# Patient Record
Sex: MALE | Race: WHITE | Employment: OTHER | URBAN - NONMETROPOLITAN AREA
[De-identification: names, ages, dates, MRNs, and addresses within clinical notes are randomized per-mention and may not be internally consistent; named-entity substitution may affect disease eponyms.]

---

## 2017-06-01 DIAGNOSIS — I10 ESSENTIAL HYPERTENSION: Primary | ICD-10-CM

## 2017-06-02 RX ORDER — LISINOPRIL 20 MG/1
TABLET ORAL
Qty: 90 TABLET | Refills: 3 | Status: SHIPPED | OUTPATIENT
Start: 2017-06-02 | End: 2017-09-20 | Stop reason: ALTCHOICE

## 2017-06-02 RX ORDER — METOPROLOL TARTRATE 50 MG/1
TABLET, FILM COATED ORAL
Qty: 90 TABLET | Refills: 3 | Status: SHIPPED | OUTPATIENT
Start: 2017-06-02 | End: 2017-09-20 | Stop reason: DRUGHIGH

## 2017-07-06 ENCOUNTER — TELEPHONE (OUTPATIENT)
Dept: CARDIOLOGY | Age: 72
End: 2017-07-06

## 2017-09-20 ENCOUNTER — OFFICE VISIT (OUTPATIENT)
Dept: CARDIOLOGY | Age: 72
End: 2017-09-20
Payer: MEDICARE

## 2017-09-20 VITALS
SYSTOLIC BLOOD PRESSURE: 122 MMHG | WEIGHT: 160 LBS | BODY MASS INDEX: 25.11 KG/M2 | HEART RATE: 88 BPM | HEIGHT: 67 IN | DIASTOLIC BLOOD PRESSURE: 70 MMHG

## 2017-09-20 DIAGNOSIS — Z95.5 HISTORY OF CORONARY ARTERY STENT PLACEMENT: ICD-10-CM

## 2017-09-20 DIAGNOSIS — I10 ESSENTIAL HYPERTENSION: ICD-10-CM

## 2017-09-20 DIAGNOSIS — E78.2 MIXED HYPERLIPIDEMIA: ICD-10-CM

## 2017-09-20 DIAGNOSIS — I25.10 CORONARY ARTERY DISEASE INVOLVING NATIVE CORONARY ARTERY OF NATIVE HEART WITHOUT ANGINA PECTORIS: Primary | ICD-10-CM

## 2017-09-20 PROCEDURE — 99213 OFFICE O/P EST LOW 20 MIN: CPT | Performed by: NURSE PRACTITIONER

## 2017-09-20 PROCEDURE — 93000 ELECTROCARDIOGRAM COMPLETE: CPT | Performed by: NURSE PRACTITIONER

## 2017-09-20 RX ORDER — PANTOPRAZOLE SODIUM 40 MG/1
40 TABLET, DELAYED RELEASE ORAL DAILY
COMMUNITY

## 2017-09-20 RX ORDER — VISMODEGIB 150 MG/1
150 CAPSULE ORAL DAILY
COMMUNITY
Start: 2017-06-21

## 2017-09-20 RX ORDER — MAGNESIUM OXIDE 400 MG/1
400 TABLET ORAL 2 TIMES DAILY
COMMUNITY

## 2017-09-20 RX ORDER — GUAIFENESIN 600 MG/1
1200 TABLET, EXTENDED RELEASE ORAL DAILY
COMMUNITY

## 2017-09-20 RX ORDER — HYDROCODONE BITARTRATE AND ACETAMINOPHEN 7.5; 325 MG/1; MG/1
1 TABLET ORAL PRN
COMMUNITY
Start: 2017-08-08

## 2017-09-20 RX ORDER — PROCHLORPERAZINE MALEATE 10 MG
10 TABLET ORAL EVERY 6 HOURS PRN
COMMUNITY

## 2017-09-20 RX ORDER — ALBUTEROL SULFATE 2.5 MG/3ML
2.5 SOLUTION RESPIRATORY (INHALATION) NIGHTLY
COMMUNITY
Start: 2017-06-26

## 2017-09-21 PROBLEM — E78.2 MIXED HYPERLIPIDEMIA: Status: ACTIVE | Noted: 2017-09-21

## 2017-09-21 PROBLEM — Z95.5 HISTORY OF CORONARY ARTERY STENT PLACEMENT: Status: ACTIVE | Noted: 2017-09-21

## 2017-11-28 ENCOUNTER — TELEPHONE (OUTPATIENT)
Dept: NEUROSURGERY | Facility: CLINIC | Age: 72
End: 2017-11-28

## 2017-11-28 ENCOUNTER — OFFICE VISIT (OUTPATIENT)
Dept: NEUROSURGERY | Facility: CLINIC | Age: 72
End: 2017-11-28

## 2017-11-28 ENCOUNTER — PREP FOR SURGERY (OUTPATIENT)
Dept: OTHER | Facility: HOSPITAL | Age: 72
End: 2017-11-28

## 2017-11-28 VITALS
DIASTOLIC BLOOD PRESSURE: 80 MMHG | HEIGHT: 68 IN | BODY MASS INDEX: 23.49 KG/M2 | SYSTOLIC BLOOD PRESSURE: 120 MMHG | WEIGHT: 155 LBS

## 2017-11-28 DIAGNOSIS — IMO0001 NORMAL BODY MASS INDEX (BMI): ICD-10-CM

## 2017-11-28 DIAGNOSIS — J98.8 RESPIRATORY DECOMPENSATION: ICD-10-CM

## 2017-11-28 DIAGNOSIS — IMO0001 COMPRESSION FRACTURE OF VERTEBRA, INITIAL ENCOUNTER: Primary | ICD-10-CM

## 2017-11-28 DIAGNOSIS — S22.080A T12 COMPRESSION FRACTURE (HCC): ICD-10-CM

## 2017-11-28 DIAGNOSIS — S32.030A CLOSED COMPRESSION FRACTURE OF THIRD LUMBAR VERTEBRA, INITIAL ENCOUNTER: ICD-10-CM

## 2017-11-28 DIAGNOSIS — S32.010A CLOSED COMPRESSION FRACTURE OF FIRST LUMBAR VERTEBRA, INITIAL ENCOUNTER: Primary | ICD-10-CM

## 2017-11-28 DIAGNOSIS — Z87.891 FORMER SMOKER: ICD-10-CM

## 2017-11-28 PROBLEM — M48.50XA VERTEBRAL COMPRESSION FRACTURE (HCC): Status: ACTIVE | Noted: 2017-11-28

## 2017-11-28 PROCEDURE — 99204 OFFICE O/P NEW MOD 45 MIN: CPT | Performed by: NURSE PRACTITIONER

## 2017-11-28 RX ORDER — MEGESTROL ACETATE 40 MG/1
40 TABLET ORAL
COMMUNITY
Start: 2017-11-02

## 2017-11-28 RX ORDER — HYDROCODONE BITARTRATE AND ACETAMINOPHEN 7.5; 325 MG/1; MG/1
1 TABLET ORAL EVERY 6 HOURS PRN
COMMUNITY
Start: 2017-08-08 | End: 2017-12-12 | Stop reason: HOSPADM

## 2017-11-28 RX ORDER — CYANOCOBALAMIN/FOLIC AC/VIT B6 1-2.5-25MG
1 TABLET ORAL DAILY
COMMUNITY
Start: 2017-09-21

## 2017-11-28 RX ORDER — MAGNESIUM OXIDE 400 MG/1
400 TABLET ORAL 2 TIMES DAILY
COMMUNITY

## 2017-11-28 RX ORDER — NITROGLYCERIN 0.4 MG/1
TABLET SUBLINGUAL
COMMUNITY
Start: 2016-10-14

## 2017-11-28 RX ORDER — CARBAMAZEPINE 200 MG/1
400 TABLET ORAL 2 TIMES DAILY
COMMUNITY
Start: 2016-10-14

## 2017-11-28 NOTE — PROGRESS NOTES
Chief complaint:   Chief Complaint   Patient presents with   • Back Pain     Abdirahman is referred today with low back pain, he has been in pain for all of November, he has brought a MRI with him today for review.         Subjective      HPI: This is a 72-year-old male gentleman who is referred to us by Dr. Karan Oh for back pain.  He is here to be evaluated today.  He says the pain began about a month ago whenever he was bending over to  a package and started having worsening pain in his low back.  He has had chronic pain before but he said this is worse than his normal back pain.  Denies any lower extremity pain.  Denies any bowel or bladder incontinence.  Denies any numbness or tingling.  He says he is having chronic dull aching pain in his back but this pain is a sharp shooting pain in his back.  It is better whenever he sitting or lying down and worse when is walking.  He rates the pain on scale 0-10 at an 8.  He says it does interfere with his activities of daily living.  Patient is right-hand dominant.  He is currently not working.  He is .  He has tried chemotherapy but was unable to tolerate this.  Currently right now he is getting radiation to his lungs his brain and his nose.  He is currently on 2 L nasal cannula.  He presents today in a wheelchair    Review of Systems   Constitutional: Positive for fatigue.   Respiratory: Positive for shortness of breath.    Musculoskeletal: Positive for back pain.   Neurological: Positive for dizziness and weakness.   All other systems reviewed and are negative.       Past Medical History:   Diagnosis Date   • Cancer     brain, lung and nose   • Heart trouble    • Hypertension      Past Surgical History:   Procedure Laterality Date   • COLON SURGERY      Had to have a section of his colon removed   • CORONARY ANEURYSM REPAIR     • CORONARY ANGIOPLASTY WITH STENT PLACEMENT       Family History   Problem Relation Age of Onset   • Heart disease Mother   "  • No Known Problems Father      Social History   Substance Use Topics   • Smoking status: Former Smoker   • Smokeless tobacco: None   • Alcohol use No       (Not in a hospital admission)  Allergies:  Review of patient's allergies indicates no known allergies.    Objective      Vital Signs  /80 (BP Location: Right arm, Patient Position: Sitting)  Ht 68\" (172.7 cm)  Wt 155 lb (70.3 kg)  BMI 23.57 kg/m2    Physical Exam   Constitutional: He is oriented to person, place, and time. He appears well-developed and well-nourished.   HENT:   Head: Normocephalic.   Eyes: Conjunctivae, EOM and lids are normal. Pupils are equal, round, and reactive to light.   Neck: Normal range of motion.   Cardiovascular: Normal rate, regular rhythm and normal heart sounds.    Pulmonary/Chest: Breath sounds normal. Accessory muscle usage present.   Abdominal: Normal appearance.   Musculoskeletal: Normal range of motion.        Lumbar back: He exhibits pain.   Neurological: He is alert and oriented to person, place, and time. He has normal strength and normal reflexes. He displays normal reflexes. No cranial nerve deficit or sensory deficit. GCS eye subscore is 4. GCS verbal subscore is 5. GCS motor subscore is 6.   Skin: Skin is warm.   Psychiatric: He has a normal mood and affect. His speech is normal and behavior is normal. Thought content normal. Cognition and memory are normal.       Results Review: MRI of lumbar spine shows that the patient does have signal changes present at T 12 L1 and L3.  There does appear to be tumor involvement at T12 and L1.  Question of tumor involvement at L3.  He also does have a spondylolisthesis at L4-5.          Assessment/Plan: At this point Dr. Engle did come in and discuss this with the patient.  He did go over the risks and benefits of kyphoplasty with vertebral body biopsy and radiofrequency ablation.  The patient is agreeable and would like to have this set up.  We will get preop testing " completed as well as preop clearance from pulmonary and anesthesiology.  Please see Dr. Engle's addendum to this patient.      Abdirahman was seen today for back pain.    Diagnoses and all orders for this visit:    Closed compression fracture of first lumbar vertebra, initial encounter    Closed compression fracture of third lumbar vertebra, initial encounter    T12 compression fracture    Normal body mass index (BMI)    Former smoker    Respiratory decompensation  -     Ambulatory Referral to Pulmonology    Other orders  -     SCANNED - IMAGING  -     Consult PAT APRN / CRNA / Anesthesia        I discussed the patients findings and my recommendations with patient    Martin Daigle, APRN  11/28/17  2:44 PM        72-year-old with a history of lung cancer.  He is had chemotherapy and radiation.  He developed severe back pain a few weeks ago.  Imaging demonstrates STIR changes at T 12 L1 and L3.  These areas are suspicious for pathologic compression fractures related to his metastatic cancer.  I would recommend a biopsy of these levels, or frequency ablation and kyphoplasty.  I would include L2 as a prophylactic kyphoplasty.  The risks and benefits of the procedure were discussed at length which included but were not limited to infection, bleeding, paralysis, spinal fluid leak, extravasation of kyphoplasty cement resulting in neural compression, pulmonary embolism, stroke, coma, and death.  In addition was very clear with him and his family that his respiratory status is a significant barrier to this procedure being done safely.  We are going to get a consult from pulmonology and anesthesia to assess his risk for general anesthesia prior to making any decision on surgery.  They knowledge understand this.  Their questions concerns were addressed.  We will get him prepped and evaluated as soon as possible

## 2017-11-28 NOTE — TELEPHONE ENCOUNTER
I SPOKE WITH PATIENTS DAUGHTER ABOUT GETTING PT'S PCP TO ORDER A CHEST XRAY, DR VICENTE'S OFFICE WILL NEED A RECENT CHEST XRAY, I INFORMED THE DAUGHTER THAT IT WILL NEED TO BE DONE PRIOR TO THEIR APPT ON 12/05/17 W/ DR VICENTE'S OFFICE AND THEY WILL NEED TO BRING A REPORT AND A CD FOR HIS PULMONOLOGY APPT, AND ALSO FOR PRE-WORK APPT SO THE CHEST XRAY WILL NOT HAVE TO BE REPEATED.

## 2017-12-05 ENCOUNTER — ANESTHESIA EVENT (OUTPATIENT)
Dept: PERIOP | Facility: HOSPITAL | Age: 72
End: 2017-12-05

## 2017-12-05 ENCOUNTER — APPOINTMENT (OUTPATIENT)
Dept: PREADMISSION TESTING | Facility: HOSPITAL | Age: 72
End: 2017-12-05

## 2017-12-05 ENCOUNTER — APPOINTMENT (OUTPATIENT)
Dept: LAB | Facility: HOSPITAL | Age: 72
End: 2017-12-05

## 2017-12-05 VITALS
WEIGHT: 158.07 LBS | DIASTOLIC BLOOD PRESSURE: 66 MMHG | OXYGEN SATURATION: 96 % | HEART RATE: 72 BPM | SYSTOLIC BLOOD PRESSURE: 149 MMHG | HEIGHT: 67 IN | BODY MASS INDEX: 24.81 KG/M2

## 2017-12-05 DIAGNOSIS — IMO0001 COMPRESSION FRACTURE OF VERTEBRA, INITIAL ENCOUNTER: ICD-10-CM

## 2017-12-05 LAB
ALBUMIN SERPL-MCNC: 4.7 G/DL (ref 3.5–5)
ALBUMIN/GLOB SERPL: 1.3 G/DL (ref 1.1–2.5)
ALP SERPL-CCNC: 98 U/L (ref 24–120)
ALT SERPL W P-5'-P-CCNC: 50 U/L (ref 0–54)
ANION GAP SERPL CALCULATED.3IONS-SCNC: 11 MMOL/L (ref 4–13)
ARTERIAL PATENCY WRIST A: POSITIVE
AST SERPL-CCNC: 47 U/L (ref 7–45)
ATMOSPHERIC PRESS: 757 MMHG
BASE EXCESS BLDA CALC-SCNC: 0.6 MMOL/L (ref 0–2)
BASOPHILS # BLD AUTO: 0.04 10*3/MM3 (ref 0–0.2)
BASOPHILS NFR BLD AUTO: 0.8 % (ref 0–2)
BDY SITE: ABNORMAL
BILIRUB SERPL-MCNC: 0.5 MG/DL (ref 0.1–1)
BILIRUB UR QL STRIP: NEGATIVE
BODY TEMPERATURE: 37 C
BUN BLD-MCNC: 14 MG/DL (ref 5–21)
BUN/CREAT SERPL: 18.2 (ref 7–25)
CALCIUM SPEC-SCNC: 10.1 MG/DL (ref 8.4–10.4)
CHLORIDE SERPL-SCNC: 102 MMOL/L (ref 98–110)
CLARITY UR: ABNORMAL
CO2 SERPL-SCNC: 27 MMOL/L (ref 24–31)
COLOR UR: YELLOW
CREAT BLD-MCNC: 0.77 MG/DL (ref 0.5–1.4)
DEPRECATED RDW RBC AUTO: 48.1 FL (ref 40–54)
EOSINOPHIL # BLD AUTO: 0.19 10*3/MM3 (ref 0–0.7)
EOSINOPHIL NFR BLD AUTO: 3.9 % (ref 0–4)
ERYTHROCYTE [DISTWIDTH] IN BLOOD BY AUTOMATED COUNT: 13.8 % (ref 12–15)
GAS FLOW AIRWAY: 2.5 LPM
GFR SERPL CREATININE-BSD FRML MDRD: 99 ML/MIN/1.73
GLOBULIN UR ELPH-MCNC: 3.5 GM/DL
GLUCOSE BLD-MCNC: 96 MG/DL (ref 70–100)
GLUCOSE UR STRIP-MCNC: NEGATIVE MG/DL
HCO3 BLDA-SCNC: 23.2 MMOL/L (ref 20–26)
HCT VFR BLD AUTO: 30.4 % (ref 40–52)
HGB BLD-MCNC: 10.3 G/DL (ref 14–18)
HGB UR QL STRIP.AUTO: NEGATIVE
IMM GRANULOCYTES # BLD: 0.01 10*3/MM3 (ref 0–0.03)
IMM GRANULOCYTES NFR BLD: 0.2 % (ref 0–5)
KETONES UR QL STRIP: NEGATIVE
LEUKOCYTE ESTERASE UR QL STRIP.AUTO: NEGATIVE
LYMPHOCYTES # BLD AUTO: 0.95 10*3/MM3 (ref 0.72–4.86)
LYMPHOCYTES NFR BLD AUTO: 19.7 % (ref 15–45)
Lab: 1645
MCH RBC QN AUTO: 32.8 PG (ref 28–32)
MCHC RBC AUTO-ENTMCNC: 33.9 G/DL (ref 33–36)
MCV RBC AUTO: 96.8 FL (ref 82–95)
MODALITY: ABNORMAL
MONOCYTES # BLD AUTO: 0.65 10*3/MM3 (ref 0.19–1.3)
MONOCYTES NFR BLD AUTO: 13.5 % (ref 4–12)
NEUTROPHILS # BLD AUTO: 2.98 10*3/MM3 (ref 1.87–8.4)
NEUTROPHILS NFR BLD AUTO: 61.9 % (ref 39–78)
NITRITE UR QL STRIP: NEGATIVE
NRBC BLD MANUAL-RTO: 0 /100 WBC (ref 0–0)
PCO2 BLDA: 29.7 MM HG (ref 35–45)
PH BLDA: 7.5 PH UNITS (ref 7.35–7.45)
PH UR STRIP.AUTO: 7 [PH] (ref 5–8)
PLATELET # BLD AUTO: 209 10*3/MM3 (ref 130–400)
PMV BLD AUTO: 9 FL (ref 6–12)
PO2 BLDA: 77.4 MM HG (ref 83–108)
POTASSIUM BLD-SCNC: 3.8 MMOL/L (ref 3.5–5.3)
PROT SERPL-MCNC: 8.2 G/DL (ref 6.3–8.7)
PROT UR QL STRIP: NEGATIVE
RBC # BLD AUTO: 3.14 10*6/MM3 (ref 4.8–5.9)
SAO2 % BLDCOA: 97.1 % (ref 94–99)
SODIUM BLD-SCNC: 140 MMOL/L (ref 135–145)
SP GR UR STRIP: 1.02 (ref 1–1.03)
UROBILINOGEN UR QL STRIP: ABNORMAL
VENTILATOR MODE: ABNORMAL
WBC NRBC COR # BLD: 4.82 10*3/MM3 (ref 4.8–10.8)

## 2017-12-05 PROCEDURE — 93010 ELECTROCARDIOGRAM REPORT: CPT | Performed by: INTERNAL MEDICINE

## 2017-12-05 PROCEDURE — 36600 WITHDRAWAL OF ARTERIAL BLOOD: CPT | Performed by: NEUROLOGICAL SURGERY

## 2017-12-05 PROCEDURE — 85025 COMPLETE CBC W/AUTO DIFF WBC: CPT | Performed by: NURSE PRACTITIONER

## 2017-12-05 PROCEDURE — 82803 BLOOD GASES ANY COMBINATION: CPT | Performed by: NEUROLOGICAL SURGERY

## 2017-12-05 PROCEDURE — 81003 URINALYSIS AUTO W/O SCOPE: CPT | Performed by: NURSE PRACTITIONER

## 2017-12-05 PROCEDURE — 36415 COLL VENOUS BLD VENIPUNCTURE: CPT

## 2017-12-05 PROCEDURE — 80053 COMPREHEN METABOLIC PANEL: CPT | Performed by: NURSE PRACTITIONER

## 2017-12-05 PROCEDURE — 93005 ELECTROCARDIOGRAM TRACING: CPT

## 2017-12-05 RX ORDER — PRAVASTATIN SODIUM 10 MG
10 TABLET ORAL NIGHTLY
COMMUNITY

## 2017-12-05 NOTE — ANESTHESIA PREPROCEDURE EVALUATION
Anesthesia Evaluation     Patient summary reviewed   no history of anesthetic complications:  NPO Solid Status: > 8 hours       Airway   Mallampati: II  TM distance: >3 FB  Neck ROM: full  Dental      Pulmonary    (+) a smoker Former, COPD, shortness of breath, wheezes,     ROS comment: Lung cancer    02 dependent 2.5 L      Cardiovascular - normal exam  Exercise tolerance: poor (<4 METS) (Limited by severe back pain and 02 requirement, denies chest pain)    ECG reviewed  Patient on routine beta blocker and Beta blocker given within 24 hours of surgery    (+) hypertension, past MI , CAD (Stent place 2003- RCA), CHF (EF 46% on stress test 2014, prior EF 40% on cath), PVD (4.6 cm AAA),     ROS comment: Followed by Dr. Wu. Prior to back injury, evaluated by cardiology on 9/2017. [place] [facility] stable with no evidence of angina.     Neuro/Psych  (-) seizures, TIA, CVA    ROS Comment: Brain cancer s/p radiation  GI/Hepatic/Renal/Endo    (-) liver disease, no renal disease, diabetes    Musculoskeletal     Abdominal    Substance History      OB/GYN          Other      history of cancer                            Anesthesia Plan    ASA 3     general   (Patient with lung cancer s/p chemotherapy and radiation with decreased PFTs and 02 dependence. Following back injury patient has had decompensation of activity level and has increased work of breathing from splinting with pain. Discussed patient's significant comorbities and risks of prolonged intubation following surgery. Patient and wife voice understanding and desire to proceed in hopes of improved pain control and improved quality of life.)  intravenous induction   Anesthetic plan and risks discussed with patient and spouse/significant other.

## 2017-12-05 NOTE — DISCHARGE INSTRUCTIONS
DAY OF SURGERY INSTRUCTIONS    Attending:   Agapito Engle MD  Kyphoplasty 3-4 Levels T12, L1, L2, L3 Kyphoplasty With Vertebral Body Biopsy And Radiofrequency Ablation-N/A  Kadie, Agapito BAUMANN MD  December 11TH 2017    ARRIVAL TIME: AS DIRECTED BY OFFICE    DAY OF SURGERY TAKE ONLY THESE MEDICATIONS: METOPROLOL        BEFORE YOU COME TO THE HOSPITAL  (Pre-op instructions)  • Do not eat, drink, smoke or chew gum after midnight the night before surgery.  This also includes no mints.  • Morning of surgery take only the medicines you have been instructed with a sip of water unless otherwise instructed  by your physician.  • Do not shave, wear makeup or dark nail polish.  • Remove all jewelry including rings.  • Leave anything you consider valuable at home.  • Leave your suitcase in the car until after your surgery.  • Bring the following with you if applicable:  o Picture ID and insurance, Medicare or Medicaid cards  o Co-pay/deductible required by insurance (cash, check, credit card)  o Copy of advance directive, living will or power-of- documents if not brought to PAT  o CPAP or BIPAP mask and tubing  o Relaxation aids (MP3 player, book, magazine)  • On the day of surgery check in at registration located at the main entrance of the hospital.       Outpatient Surgery Guidelines, Adult  Outpatient procedures are those for which the person having the procedure is allowed to go home the same day as the procedure. Various procedures are done on an outpatient basis. You should follow some general guidelines if you will be having an outpatient procedure.  LET YOUR HEALTH CARE PROVIDER KNOW ABOUT:  · Any allergies you have.  · All medicines you are taking, including vitamins, herbs, eye drops, creams, and over-the-counter medicines.  · Previous problems you or members of your family have had with the use of anesthetics.  · Any blood disorders you have.  · Previous surgeries you have had.  · Medical conditions you  have.  RISKS AND COMPLICATIONS  Your health care provider will discuss possible risks and complications with you before surgery. Common risks and complications include:    · Problems due to the use of anesthetics.  · Blood loss and replacement (does not apply to minor surgical procedures).  · Temporary increase in pain due to surgery.  · Uncorrected pain or problems that the surgery was meant to correct.  · Infection.  · New damage.  BEFORE THE PROCEDURE  · Ask your health care provider about changing or stopping your regular medicines. You may need to stop taking certain medicines in the days or weeks before the procedure.  · Stop smoking at least 2 weeks before surgery. This lowers your risk for complications during and after surgery. Ask your health care provider for help with this if needed.  · Eat your usual meals and a light supper the day before surgery. Continue fluid intake. Do not drink alcohol.  · Do not eat or drink after midnight the night before your surgery.   · Arrange for someone to take you home and to stay with you for 24 hours after the procedure. Medicine given for your procedure may affect your ability to drive or to care for yourself.  · Call your health care provider's office if you develop an illness or problem that may prevent you from safely having your procedure.  AFTER THE PROCEDURE  After surgery, you will be taken to a recovery area, where your progress will be monitored. If there are no complications, you will be allowed to go home when you are awake, stable, and taking fluids well. You may have numbness around the surgical site. Healing will take some time. You will have tenderness at the surgical site and may have some swelling and bruising. You may also have some nausea.  HOME CARE INSTRUCTIONS  · Do not drive for 24 hours, or as directed by your health care provider. Do not drive while taking prescription pain medicines.  · Do not drink alcohol for 24 hours.  · Do not make  important decisions or sign legal documents for 24 hours.  · You may resume a normal diet and activities as directed.  · Do not lift anything heavier than 10 pounds (4.5 kg) or play contact sports until your health care provider says it is okay.  · Change your bandages (dressings) as directed.  · Only take over-the-counter or prescription medicines as directed by your health care provider.  · Follow up with your health care provider as directed.  SEEK MEDICAL CARE IF:  · You have increased bleeding (more than a small spot) from the surgical site.  · You have redness, swelling, or increasing pain in the wound.  · You see pus coming from the wound.  · You have a fever.  · You notice a bad smell coming from the wound or dressing.  · You feel lightheaded or faint.  · You develop a rash.  · You have trouble breathing.  · You develop allergies.  MAKE SURE YOU:  · Understand these instructions.  · Will watch your condition.  · Will get help right away if you are not doing well or get worse.     This information is not intended to replace advice given to you by your health care provider. Make sure you discuss any questions you have with your health care provider.     Document Released: 09/12/2002 Document Revised: 05/03/2016 Document Reviewed: 05/22/2014  [employer] Interactive Patient Education ©2016 [employer]       Fall Prevention in Hospitals, Adult  As a hospital patient, your condition and the treatments you receive can increase your risk for falls. Some additional risk factors for falls in a hospital include:  · Being in an unfamiliar environment.  · Being on bed rest.  · Your surgery.  · Taking certain medicines.  · Your tubing requirements, such as intravenous (IV) therapy or catheters.  It is important that you learn how to decrease fall risks while at the hospital. Below are important tips that can help prevent falls.  SAFETY TIPS FOR PREVENTING FALLS  Talk about your risk of falling.  · Ask your health care  provider why you are at risk for falling. Is it your medicine, illness, tubing placement, or something else?  · Make a plan with your health care provider to keep you safe from falls.  · Ask your health care provider or pharmacist about side effects of your medicines. Some medicines can make you dizzy or affect your coordination.  Ask for help.  · Ask for help before getting out of bed. You may need to press your call button.  · Ask for assistance in getting safely to the toilet.  · Ask for a walker or cane to be put at your bedside. Ask that most of the side rails on your bed be placed up before your health care provider leaves the room.  · Ask family or friends to sit with you.  · Ask for things that are out of your reach, such as your glasses, hearing aids, telephone, bedside table, or call button.  Follow these tips to avoid falling:  · Stay lying or seated, rather than standing, while waiting for help.  · Wear rubber-soled slippers or shoes whenever you walk in the hospital.  · Avoid quick, sudden movements.  ¨ Change positions slowly.  ¨ Sit on the side of your bed before standing.  ¨ Stand up slowly and wait before you start to walk.  · Let your health care provider know if there is a spill on the floor.  · Pay careful attention to the medical equipment, electrical cords, and tubes around you.  · When you need help, use your call button by your bed or in the bathroom. Wait for one of your health care providers to help you.  · If you feel dizzy or unsure of your footing, return to bed and wait for assistance.  · Avoid being distracted by the TV, telephone, or another person in your room.  · Do not lean or support yourself on rolling objects, such as IV poles or bedside tables.     This information is not intended to replace advice given to you by your health care provider. Make sure you discuss any questions you have with your health care provider.     Document Released: 12/15/2001 Document Revised: 01/08/2016  Document Reviewed: 08/25/2013  [employer] Interactive Patient Education ©2016 [employer]       Surgical Site Infections FAQs  What is a Surgical Site Infection (SSI)?  A surgical site infection is an infection that occurs after surgery in the part of the body where the surgery took place. Most patients who have surgery do not develop an infection. However, infections develop in about 1 to 3 out of every 100 patients who have surgery.  Some of the common symptoms of a surgical site infection are:  · Redness and pain around the area where you had surgery  · Drainage of cloudy fluid from your surgical wound  · Fever  Can SSIs be treated?  Yes. Most surgical site infections can be treated with antibiotics. The antibiotic given to you depends on the bacteria (germs) causing the infection. Sometimes patients with SSIs also need another surgery to treat the infection.  What are some of the things that hospitals are doing to prevent SSIs?  To prevent SSIs, doctors, nurses, and other healthcare providers:  · Clean their hands and arms up to their elbows with an antiseptic agent just before the surgery.  · Clean their hands with soap and water or an alcohol-based hand rub before and after caring for each patient.  · May remove some of your hair immediately before your surgery using electric clippers if the hair is in the same area where the procedure will occur. They should not shave you with a razor.  · Wear special hair covers, masks, gowns, and gloves during surgery to keep the surgery area clean.  · Give you antibiotics before your surgery starts. In most cases, you should get antibiotics within 60 minutes before the surgery starts and the antibiotics should be stopped within 24 hours after surgery.  · Clean the skin at the site of your surgery with a special soap that kills germs.  What can I do to help prevent SSIs?  Before your surgery:  · Tell your doctor about other medical problems you may have. Health problems  such as allergies, diabetes, and obesity could affect your surgery and your treatment.  · Quit smoking. Patients who smoke get more infections. Talk to your doctor about how you can quit before your surgery.  · Do not shave near where you will have surgery. Shaving with a razor can irritate your skin and make it easier to develop an infection.  At the time of your surgery:  · Speak up if someone tries to shave you with a razor before surgery. Ask why you need to be shaved and talk with your surgeon if you have any concerns.  · Ask if you will get antibiotics before surgery.  After your surgery:  · Make sure that your healthcare providers clean their hands before examining you, either with soap and water or an alcohol-based hand rub.  · If you do not see your providers clean their hands, please ask them to do so.  · Family and friends who visit you should not touch the surgical wound or dressings.  · Family and friends should clean their hands with soap and water or an alcohol-based hand rub before and after visiting you. If you do not see them clean their hands, ask them to clean their hands.  What do I need to do when I go home from the hospital?  · Before you go home, your doctor or nurse should explain everything you need to know about taking care of your wound. Make sure you understand how to care for your wound before you leave the hospital.  · Always clean your hands before and after caring for your wound.  · Before you go home, make sure you know who to contact if you have questions or problems after you get home.  · If you have any symptoms of an infection, such as redness and pain at the surgery site, drainage, or fever, call your doctor immediately.  If you have additional questions, please ask your doctor or nurse.  Developed and co-sponsored by The Society for Healthcare Epidemiology of [place] (SHEA); Infectious Diseases Society of [place] (IDSA); American Hospital Association; Association for  Professionals in Infection Control and Epidemiology (APIC); Centers for Disease Control and Prevention (CDC); and The Joint Commission.     This information is not intended to replace advice given to you by your health care provider. Make sure you discuss any questions you have with your health care provider.     Document Released: 12/23/2014 Document Revised: 01/08/2016 Document Reviewed: 03/02/2016  [employer] Interactive Patient Education ©2016 [employer]       [facility]  CHG 4% Patient Instruction Sheet    Preparing the Skin Before Surgery  Preparing or “prepping” skin before surgery can reduce the risk of infection at the surgical site. To make the process easier,[facility] has chosen 4% Chlorhexidine Gluconate (CHG) antiseptic solution.   The steps below outline the prepping process and should be carefully followed.                                                                                                                                                      Prep the skin at the following time(s):                                                      We recommend you shower the night before surgery, and again the morning of surgery with the 4% CHG antiseptic solution using half of the bottle and a cloth each time.  Dress in clean clothes/sleepwear after showering.  See instructions below for application.          Do not apply any lotions or moisturizers.       Do not shave the area to be prepped for at least 2 days prior to surgery.    Clipping the hair may be done immediately prior to your surgery at the hospital    if needed.    Directions:  Thoroughly rinse your body with water.  Apply 4% CHG to a cloth and wash skin gently, paying special attention to the operative site.  Rinse again thoroughly.  Once you have begun using this product do not apply anything else to your skin. If itching or redness persists, rinse affected areas and discontinue use.    When using this product:  • Keep out of eyes,  ears, and mouth.  • If solution should contact these areas, rinse out promptly and thoroughly with water.  • For external use only.  • Do not use in genital area, on your face or head.      PATIENT/FAMILY/RESPONSIBLE PARTY VERBALIZES UNDERSTANDING OF ABOVE EDUCATION.  COPY OF PAIN SCALE GIVEN AND REVIEWED WITH VERBALIZED UNDERSTANDING.

## 2017-12-08 RX ORDER — SODIUM CHLORIDE 0.9 % (FLUSH) 0.9 %
1-10 SYRINGE (ML) INJECTION AS NEEDED
Status: DISCONTINUED | OUTPATIENT
Start: 2017-12-08 | End: 2017-12-12 | Stop reason: HOSPADM

## 2017-12-08 RX ORDER — SODIUM CHLORIDE, SODIUM LACTATE, POTASSIUM CHLORIDE, CALCIUM CHLORIDE 600; 310; 30; 20 MG/100ML; MG/100ML; MG/100ML; MG/100ML
30 INJECTION, SOLUTION INTRAVENOUS CONTINUOUS
Status: DISCONTINUED | OUTPATIENT
Start: 2017-12-08 | End: 2017-12-12 | Stop reason: HOSPADM

## 2017-12-11 ENCOUNTER — HOSPITAL ENCOUNTER (OUTPATIENT)
Facility: HOSPITAL | Age: 72
Setting detail: OBSERVATION
Discharge: HOME | End: 2017-12-12
Attending: NEUROLOGICAL SURGERY | Admitting: NEUROLOGICAL SURGERY

## 2017-12-11 ENCOUNTER — APPOINTMENT (OUTPATIENT)
Dept: GENERAL RADIOLOGY | Facility: HOSPITAL | Age: 72
End: 2017-12-11

## 2017-12-11 ENCOUNTER — ANESTHESIA (OUTPATIENT)
Dept: PERIOP | Facility: HOSPITAL | Age: 72
End: 2017-12-11

## 2017-12-11 DIAGNOSIS — Z78.9 IMPAIRED MOBILITY AND ADLS: ICD-10-CM

## 2017-12-11 DIAGNOSIS — Z74.09 IMPAIRED FUNCTIONAL MOBILITY, BALANCE, GAIT, AND ENDURANCE: ICD-10-CM

## 2017-12-11 DIAGNOSIS — IMO0001 COMPRESSION FRACTURE OF VERTEBRA, INITIAL ENCOUNTER: ICD-10-CM

## 2017-12-11 DIAGNOSIS — Z74.09 IMPAIRED MOBILITY AND ADLS: ICD-10-CM

## 2017-12-11 PROBLEM — M48.50XA COMPRESSION FRACTURE OF SPINE (HCC): Status: ACTIVE | Noted: 2017-12-11

## 2017-12-11 LAB
ABO GROUP BLD: NORMAL
BLD GP AB SCN SERPL QL: NEGATIVE
RH BLD: POSITIVE

## 2017-12-11 PROCEDURE — G0378 HOSPITAL OBSERVATION PER HR: HCPCS

## 2017-12-11 PROCEDURE — 88307 TISSUE EXAM BY PATHOLOGIST: CPT | Performed by: NEUROLOGICAL SURGERY

## 2017-12-11 PROCEDURE — 22515 PERQ VERTEBRAL AUGMENTATION: CPT | Performed by: NEUROLOGICAL SURGERY

## 2017-12-11 PROCEDURE — 25010000002 FENTANYL CITRATE (PF) 250 MCG/5ML SOLUTION: Performed by: NURSE ANESTHETIST, CERTIFIED REGISTERED

## 2017-12-11 PROCEDURE — 86850 RBC ANTIBODY SCREEN: CPT | Performed by: NURSE PRACTITIONER

## 2017-12-11 PROCEDURE — 20982 ABLATE BONE TUMOR(S) PERQ: CPT | Performed by: NEUROLOGICAL SURGERY

## 2017-12-11 PROCEDURE — 72100 X-RAY EXAM L-S SPINE 2/3 VWS: CPT

## 2017-12-11 PROCEDURE — 25010000002 HYDROMORPHONE PER 4 MG: Performed by: NEUROLOGICAL SURGERY

## 2017-12-11 PROCEDURE — C1713 ANCHOR/SCREW BN/BN,TIS/BN: HCPCS | Performed by: NEUROLOGICAL SURGERY

## 2017-12-11 PROCEDURE — 94799 UNLISTED PULMONARY SVC/PX: CPT

## 2017-12-11 PROCEDURE — 86900 BLOOD TYPING SEROLOGIC ABO: CPT | Performed by: NURSE PRACTITIONER

## 2017-12-11 PROCEDURE — 25010000002 DEXAMETHASONE PER 1 MG: Performed by: ANESTHESIOLOGY

## 2017-12-11 PROCEDURE — 25010000002 ONDANSETRON PER 1 MG: Performed by: NEUROLOGICAL SURGERY

## 2017-12-11 PROCEDURE — 88311 DECALCIFY TISSUE: CPT | Performed by: NEUROLOGICAL SURGERY

## 2017-12-11 PROCEDURE — 22513 PERQ VERTEBRAL AUGMENTATION: CPT | Performed by: NEUROLOGICAL SURGERY

## 2017-12-11 PROCEDURE — 25010000002 PROPOFOL 10 MG/ML EMULSION: Performed by: NURSE ANESTHETIST, CERTIFIED REGISTERED

## 2017-12-11 PROCEDURE — 25010000003 CEFAZOLIN PER 500 MG: Performed by: NURSE PRACTITIONER

## 2017-12-11 PROCEDURE — 76000 FLUOROSCOPY <1 HR PHYS/QHP: CPT

## 2017-12-11 PROCEDURE — 25010000003 CEFAZOLIN 1 GM/50ML SOLUTION: Performed by: NEUROLOGICAL SURGERY

## 2017-12-11 PROCEDURE — 86901 BLOOD TYPING SEROLOGIC RH(D): CPT | Performed by: NURSE PRACTITIONER

## 2017-12-11 PROCEDURE — 0 IOPAMIDOL 61 % SOLUTION: Performed by: NEUROLOGICAL SURGERY

## 2017-12-11 PROCEDURE — 25010000002 ONDANSETRON PER 1 MG: Performed by: ANESTHESIOLOGY

## 2017-12-11 DEVICE — BONE CEMENT C01B HV-R WITH MIXER  US
Type: IMPLANTABLE DEVICE | Status: FUNCTIONAL
Brand: KYPHON® HV-R® BONE CEMENT AND KYPHON® MIXER PACK

## 2017-12-11 RX ORDER — FENTANYL CITRATE 50 UG/ML
INJECTION, SOLUTION INTRAMUSCULAR; INTRAVENOUS AS NEEDED
Status: DISCONTINUED | OUTPATIENT
Start: 2017-12-11 | End: 2017-12-11 | Stop reason: SURG

## 2017-12-11 RX ORDER — LABETALOL HYDROCHLORIDE 5 MG/ML
5 INJECTION, SOLUTION INTRAVENOUS
Status: DISCONTINUED | OUTPATIENT
Start: 2017-12-11 | End: 2017-12-11 | Stop reason: HOSPADM

## 2017-12-11 RX ORDER — DIPHENHYDRAMINE HYDROCHLORIDE 50 MG/ML
12.5 INJECTION INTRAMUSCULAR; INTRAVENOUS
Status: DISCONTINUED | OUTPATIENT
Start: 2017-12-11 | End: 2017-12-11 | Stop reason: HOSPADM

## 2017-12-11 RX ORDER — MAGNESIUM HYDROXIDE 1200 MG/15ML
LIQUID ORAL AS NEEDED
Status: DISCONTINUED | OUTPATIENT
Start: 2017-12-11 | End: 2017-12-11 | Stop reason: HOSPADM

## 2017-12-11 RX ORDER — MIDAZOLAM HYDROCHLORIDE 1 MG/ML
1 INJECTION INTRAMUSCULAR; INTRAVENOUS
Status: DISCONTINUED | OUTPATIENT
Start: 2017-12-11 | End: 2017-12-11 | Stop reason: HOSPADM

## 2017-12-11 RX ORDER — DEXTROSE MONOHYDRATE 25 G/50ML
12.5 INJECTION, SOLUTION INTRAVENOUS AS NEEDED
Status: DISCONTINUED | OUTPATIENT
Start: 2017-12-11 | End: 2017-12-11 | Stop reason: HOSPADM

## 2017-12-11 RX ORDER — SODIUM CHLORIDE, SODIUM LACTATE, POTASSIUM CHLORIDE, CALCIUM CHLORIDE 600; 310; 30; 20 MG/100ML; MG/100ML; MG/100ML; MG/100ML
1000 INJECTION, SOLUTION INTRAVENOUS CONTINUOUS
Status: DISCONTINUED | OUTPATIENT
Start: 2017-12-11 | End: 2017-12-12 | Stop reason: HOSPADM

## 2017-12-11 RX ORDER — HYDROCODONE BITARTRATE AND ACETAMINOPHEN 7.5; 325 MG/1; MG/1
1 TABLET ORAL EVERY 4 HOURS PRN
Status: DISCONTINUED | OUTPATIENT
Start: 2017-12-11 | End: 2017-12-12 | Stop reason: HOSPADM

## 2017-12-11 RX ORDER — PHENYLEPHRINE HCL IN 0.9% NACL 0.8MG/10ML
SYRINGE (ML) INTRAVENOUS AS NEEDED
Status: DISCONTINUED | OUTPATIENT
Start: 2017-12-11 | End: 2017-12-11 | Stop reason: SURG

## 2017-12-11 RX ORDER — BISACODYL 10 MG
10 SUPPOSITORY, RECTAL RECTAL DAILY PRN
Status: DISCONTINUED | OUTPATIENT
Start: 2017-12-11 | End: 2017-12-12 | Stop reason: HOSPADM

## 2017-12-11 RX ORDER — MEPERIDINE HYDROCHLORIDE 25 MG/ML
12.5 INJECTION INTRAMUSCULAR; INTRAVENOUS; SUBCUTANEOUS
Status: COMPLETED | OUTPATIENT
Start: 2017-12-11 | End: 2017-12-11

## 2017-12-11 RX ORDER — HYDRALAZINE HYDROCHLORIDE 20 MG/ML
5 INJECTION INTRAMUSCULAR; INTRAVENOUS
Status: DISCONTINUED | OUTPATIENT
Start: 2017-12-11 | End: 2017-12-11 | Stop reason: HOSPADM

## 2017-12-11 RX ORDER — ATORVASTATIN CALCIUM 10 MG/1
10 TABLET, FILM COATED ORAL DAILY
Status: DISCONTINUED | OUTPATIENT
Start: 2017-12-11 | End: 2017-12-12 | Stop reason: HOSPADM

## 2017-12-11 RX ORDER — SENNA AND DOCUSATE SODIUM 50; 8.6 MG/1; MG/1
1 TABLET, FILM COATED ORAL NIGHTLY PRN
Status: DISCONTINUED | OUTPATIENT
Start: 2017-12-11 | End: 2017-12-12 | Stop reason: HOSPADM

## 2017-12-11 RX ORDER — HYDROCODONE BITARTRATE AND ACETAMINOPHEN 7.5; 325 MG/1; MG/1
1 TABLET ORAL EVERY 6 HOURS PRN
Status: DISCONTINUED | OUTPATIENT
Start: 2017-12-11 | End: 2017-12-12 | Stop reason: HOSPADM

## 2017-12-11 RX ORDER — MEGESTROL ACETATE 40 MG/1
40 TABLET ORAL DAILY
Status: DISCONTINUED | OUTPATIENT
Start: 2017-12-11 | End: 2017-12-12 | Stop reason: HOSPADM

## 2017-12-11 RX ORDER — FENTANYL CITRATE 50 UG/ML
25 INJECTION, SOLUTION INTRAMUSCULAR; INTRAVENOUS
Status: DISCONTINUED | OUTPATIENT
Start: 2017-12-11 | End: 2017-12-11 | Stop reason: HOSPADM

## 2017-12-11 RX ORDER — DOCUSATE SODIUM 100 MG/1
100 CAPSULE, LIQUID FILLED ORAL 2 TIMES DAILY PRN
Status: DISCONTINUED | OUTPATIENT
Start: 2017-12-11 | End: 2017-12-12 | Stop reason: HOSPADM

## 2017-12-11 RX ORDER — SODIUM CHLORIDE 9 MG/ML
75 INJECTION, SOLUTION INTRAVENOUS CONTINUOUS
Status: DISCONTINUED | OUTPATIENT
Start: 2017-12-11 | End: 2017-12-12 | Stop reason: HOSPADM

## 2017-12-11 RX ORDER — DEXAMETHASONE SODIUM PHOSPHATE 4 MG/ML
4 INJECTION, SOLUTION INTRA-ARTICULAR; INTRALESIONAL; INTRAMUSCULAR; INTRAVENOUS; SOFT TISSUE ONCE AS NEEDED
Status: COMPLETED | OUTPATIENT
Start: 2017-12-11 | End: 2017-12-11

## 2017-12-11 RX ORDER — SODIUM CHLORIDE, SODIUM LACTATE, POTASSIUM CHLORIDE, CALCIUM CHLORIDE 600; 310; 30; 20 MG/100ML; MG/100ML; MG/100ML; MG/100ML
9 INJECTION, SOLUTION INTRAVENOUS CONTINUOUS
Status: DISCONTINUED | OUTPATIENT
Start: 2017-12-11 | End: 2017-12-12 | Stop reason: HOSPADM

## 2017-12-11 RX ORDER — MIDAZOLAM HYDROCHLORIDE 1 MG/ML
2 INJECTION INTRAMUSCULAR; INTRAVENOUS
Status: DISCONTINUED | OUTPATIENT
Start: 2017-12-11 | End: 2017-12-11 | Stop reason: HOSPADM

## 2017-12-11 RX ORDER — CARBAMAZEPINE 200 MG/1
400 TABLET ORAL EVERY 12 HOURS SCHEDULED
Status: DISCONTINUED | OUTPATIENT
Start: 2017-12-11 | End: 2017-12-12 | Stop reason: HOSPADM

## 2017-12-11 RX ORDER — LIDOCAINE HYDROCHLORIDE 20 MG/ML
INJECTION, SOLUTION INFILTRATION; PERINEURAL AS NEEDED
Status: DISCONTINUED | OUTPATIENT
Start: 2017-12-11 | End: 2017-12-11 | Stop reason: SURG

## 2017-12-11 RX ORDER — SODIUM CHLORIDE 0.9 % (FLUSH) 0.9 %
3 SYRINGE (ML) INJECTION AS NEEDED
Status: DISCONTINUED | OUTPATIENT
Start: 2017-12-11 | End: 2017-12-11 | Stop reason: HOSPADM

## 2017-12-11 RX ORDER — NALOXONE HCL 0.4 MG/ML
0.4 VIAL (ML) INJECTION AS NEEDED
Status: DISCONTINUED | OUTPATIENT
Start: 2017-12-11 | End: 2017-12-11 | Stop reason: HOSPADM

## 2017-12-11 RX ORDER — SODIUM CHLORIDE 0.9 % (FLUSH) 0.9 %
1-10 SYRINGE (ML) INJECTION AS NEEDED
Status: DISCONTINUED | OUTPATIENT
Start: 2017-12-11 | End: 2017-12-12 | Stop reason: HOSPADM

## 2017-12-11 RX ORDER — NALOXONE HCL 0.4 MG/ML
0.4 VIAL (ML) INJECTION
Status: DISCONTINUED | OUTPATIENT
Start: 2017-12-11 | End: 2017-12-12 | Stop reason: HOSPADM

## 2017-12-11 RX ORDER — NITROGLYCERIN 0.4 MG/1
0.4 TABLET SUBLINGUAL
Status: DISCONTINUED | OUTPATIENT
Start: 2017-12-11 | End: 2017-12-12 | Stop reason: HOSPADM

## 2017-12-11 RX ORDER — IPRATROPIUM BROMIDE AND ALBUTEROL SULFATE 2.5; .5 MG/3ML; MG/3ML
3 SOLUTION RESPIRATORY (INHALATION) ONCE AS NEEDED
Status: DISCONTINUED | OUTPATIENT
Start: 2017-12-11 | End: 2017-12-11 | Stop reason: HOSPADM

## 2017-12-11 RX ORDER — ACETAMINOPHEN 325 MG/1
650 TABLET ORAL EVERY 4 HOURS PRN
Status: DISCONTINUED | OUTPATIENT
Start: 2017-12-11 | End: 2017-12-12 | Stop reason: HOSPADM

## 2017-12-11 RX ORDER — PROPOFOL 10 MG/ML
VIAL (ML) INTRAVENOUS AS NEEDED
Status: DISCONTINUED | OUTPATIENT
Start: 2017-12-11 | End: 2017-12-11 | Stop reason: SURG

## 2017-12-11 RX ORDER — ROCURONIUM BROMIDE 10 MG/ML
INJECTION, SOLUTION INTRAVENOUS AS NEEDED
Status: DISCONTINUED | OUTPATIENT
Start: 2017-12-11 | End: 2017-12-11 | Stop reason: SURG

## 2017-12-11 RX ORDER — ONDANSETRON 2 MG/ML
4 INJECTION INTRAMUSCULAR; INTRAVENOUS EVERY 6 HOURS PRN
Status: DISCONTINUED | OUTPATIENT
Start: 2017-12-11 | End: 2017-12-12 | Stop reason: HOSPADM

## 2017-12-11 RX ORDER — HYDROMORPHONE HCL 110MG/55ML
0.5 PATIENT CONTROLLED ANALGESIA SYRINGE INTRAVENOUS
Status: DISCONTINUED | OUTPATIENT
Start: 2017-12-11 | End: 2017-12-12 | Stop reason: HOSPADM

## 2017-12-11 RX ORDER — SODIUM CHLORIDE 0.9 % (FLUSH) 0.9 %
1-10 SYRINGE (ML) INJECTION AS NEEDED
Status: DISCONTINUED | OUTPATIENT
Start: 2017-12-11 | End: 2017-12-11 | Stop reason: HOSPADM

## 2017-12-11 RX ORDER — MORPHINE SULFATE 2 MG/ML
2 INJECTION, SOLUTION INTRAMUSCULAR; INTRAVENOUS
Status: DISCONTINUED | OUTPATIENT
Start: 2017-12-11 | End: 2017-12-11 | Stop reason: HOSPADM

## 2017-12-11 RX ORDER — ONDANSETRON 2 MG/ML
4 INJECTION INTRAMUSCULAR; INTRAVENOUS ONCE AS NEEDED
Status: COMPLETED | OUTPATIENT
Start: 2017-12-11 | End: 2017-12-11

## 2017-12-11 RX ADMIN — SODIUM CHLORIDE, POTASSIUM CHLORIDE, SODIUM LACTATE AND CALCIUM CHLORIDE 1000 ML: 600; 310; 30; 20 INJECTION, SOLUTION INTRAVENOUS at 10:44

## 2017-12-11 RX ADMIN — LIDOCAINE HYDROCHLORIDE 60 MG: 20 INJECTION, SOLUTION INFILTRATION; PERINEURAL at 13:30

## 2017-12-11 RX ADMIN — CEFAZOLIN SODIUM 2 G: 2 SOLUTION INTRAVENOUS at 13:45

## 2017-12-11 RX ADMIN — CARBAMAZEPINE 400 MG: 200 TABLET ORAL at 20:18

## 2017-12-11 RX ADMIN — PROPOFOL 120 MG: 10 INJECTION, EMULSION INTRAVENOUS at 13:30

## 2017-12-11 RX ADMIN — Medication 80 MCG: at 13:55

## 2017-12-11 RX ADMIN — HYDROMORPHONE HYDROCHLORIDE 0.5 MG: 2 INJECTION, SOLUTION INTRAMUSCULAR; INTRAVENOUS; SUBCUTANEOUS at 17:48

## 2017-12-11 RX ADMIN — MEPERIDINE HYDROCHLORIDE 12.5 MG: 25 INJECTION INTRAMUSCULAR; INTRAVENOUS; SUBCUTANEOUS at 15:59

## 2017-12-11 RX ADMIN — MEPERIDINE HYDROCHLORIDE 12.5 MG: 25 INJECTION INTRAMUSCULAR; INTRAVENOUS; SUBCUTANEOUS at 15:54

## 2017-12-11 RX ADMIN — METOPROLOL TARTRATE 12.5 MG: 25 TABLET, FILM COATED ORAL at 20:18

## 2017-12-11 RX ADMIN — SODIUM CHLORIDE, POTASSIUM CHLORIDE, SODIUM LACTATE AND CALCIUM CHLORIDE 1000 ML: 600; 310; 30; 20 INJECTION, SOLUTION INTRAVENOUS at 16:01

## 2017-12-11 RX ADMIN — DEXAMETHASONE SODIUM PHOSPHATE 4 MG: 4 INJECTION, SOLUTION INTRAMUSCULAR; INTRAVENOUS at 12:37

## 2017-12-11 RX ADMIN — ONDANSETRON 4 MG: 2 INJECTION, SOLUTION INTRAMUSCULAR; INTRAVENOUS at 15:53

## 2017-12-11 RX ADMIN — Medication 80 MCG: at 13:49

## 2017-12-11 RX ADMIN — PROPOFOL 40 MG: 10 INJECTION, EMULSION INTRAVENOUS at 13:34

## 2017-12-11 RX ADMIN — HYDROMORPHONE HYDROCHLORIDE 0.5 MG: 2 INJECTION, SOLUTION INTRAMUSCULAR; INTRAVENOUS; SUBCUTANEOUS at 22:21

## 2017-12-11 RX ADMIN — FENTANYL CITRATE 150 MCG: 50 INJECTION INTRAMUSCULAR; INTRAVENOUS at 14:25

## 2017-12-11 RX ADMIN — LIDOCAINE HYDROCHLORIDE 0.5 ML: 10 INJECTION, SOLUTION EPIDURAL; INFILTRATION; INTRACAUDAL; PERINEURAL at 10:44

## 2017-12-11 RX ADMIN — HYDROCODONE BITARTRATE AND ACETAMINOPHEN 1 TABLET: 7.5; 325 TABLET ORAL at 20:18

## 2017-12-11 RX ADMIN — ATORVASTATIN CALCIUM 10 MG: 10 TABLET, FILM COATED ORAL at 18:09

## 2017-12-11 RX ADMIN — Medication 80 MCG: at 13:34

## 2017-12-11 RX ADMIN — MEGESTROL ACETATE 40 MG: 40 TABLET ORAL at 18:09

## 2017-12-11 RX ADMIN — SODIUM CHLORIDE 75 ML/HR: 9 INJECTION, SOLUTION INTRAVENOUS at 17:49

## 2017-12-11 RX ADMIN — CEFAZOLIN SODIUM 1 G: 1 INJECTION, SOLUTION INTRAVENOUS at 22:14

## 2017-12-11 RX ADMIN — ROCURONIUM BROMIDE 35 MG: 10 INJECTION INTRAVENOUS at 13:31

## 2017-12-11 RX ADMIN — ROCURONIUM BROMIDE 5 MG: 10 INJECTION INTRAVENOUS at 13:30

## 2017-12-11 RX ADMIN — FENTANYL CITRATE 100 MCG: 50 INJECTION INTRAMUSCULAR; INTRAVENOUS at 13:30

## 2017-12-11 RX ADMIN — ONDANSETRON 4 MG: 2 INJECTION, SOLUTION INTRAMUSCULAR; INTRAVENOUS at 20:23

## 2017-12-11 NOTE — PLAN OF CARE
Problem: Patient Care Overview (Adult)  Goal: Plan of Care Review  Outcome: Ongoing (interventions implemented as appropriate)    12/11/17 1324   Coping/Psychosocial Response Interventions   Plan Of Care Reviewed With patient   Patient Care Overview   Progress no change   Outcome Evaluation   Outcome Summary/Follow up Plan no changes in the preop holding phase

## 2017-12-11 NOTE — ANESTHESIA POSTPROCEDURE EVALUATION
Patient: Abdirahman Cheung    Procedure Summary     Date Anesthesia Start Anesthesia Stop Room / Location    12/11/17 8625 [id number]  [facility] OR 07 / [facility]       Procedure Diagnosis Surgeon Provider    KYPHOPLASTY 3-4 LEVELS T12, L1, L2, L3 kyphoplasty with vertebral body biopsy and radiofrequency ablation (N/A Spine Lumbar) Compression fracture of vertebra, initial encounter  (Compression fracture of vertebra, initial encounter [M48.50XA]) MD Homero Damon CRNA          Anesthesia Type: general  Last vitals  BP   139/64 (12/11/17 1642)   Temp   97.7 °F (36.5 °C) (12/11/17 1642)   Pulse   89 (12/11/17 1642)   Resp   16 (12/11/17 1642)     SpO2   97 % (12/11/17 1642)     Post Anesthesia Care and Evaluation    Patient location during evaluation: PACU  Patient participation: complete - patient participated  Level of consciousness: awake and alert  Pain score: 1  Pain management: adequate  Airway patency: patent  Anesthetic complications: No anesthetic complications    Cardiovascular status: acceptable  Respiratory status: room air  Hydration status: acceptable    Blood pressure 139/64, pulse 89, temperature 97.7 °F (36.5 °C), temperature source Oral, resp. rate 16, SpO2 97 %.

## 2017-12-11 NOTE — PLAN OF CARE
Problem: Perioperative Period (Adult)  Goal: Signs and Symptoms of Listed Potential Problems Will be Absent or Manageable (Perioperative Period)  Outcome: Ongoing (interventions implemented as appropriate)    12/11/17 1220   Perioperative Period   Problems Assessed (Perioperative Period) pain;hypothermia;hypoxia/hypoxemia;perioperative injury;situational response   Problems Present (Perioperative Period) situational response

## 2017-12-11 NOTE — PLAN OF CARE
Problem: Patient Care Overview (Adult)  Goal: Plan of Care Review  Outcome: Ongoing (interventions implemented as appropriate)    12/11/17 0677   Coping/Psychosocial Response Interventions   Plan Of Care Reviewed With patient   Patient Care Overview   Progress no change   Outcome Evaluation   Outcome Summary/Follow up Plan patient states pain is 5/10 scale and is tolerable, pacu discharge critera met         Problem: Perioperative Period (Adult)  Goal: Signs and Symptoms of Listed Potential Problems Will be Absent or Manageable (Perioperative Period)  Outcome: Ongoing (interventions implemented as appropriate)

## 2017-12-11 NOTE — ANESTHESIA PROCEDURE NOTES
Airway  Urgency: elective    Airway not difficult    General Information and Staff    Patient location during procedure: OR  CRNA: ALISSA PETERS    Indications and Patient Condition  Indications for airway management: airway protection    Preoxygenated: yes  Mask difficulty assessment: 2 - vent by mask + OA or adjuvant +/- NMBA    Final Airway Details  Final airway type: endotracheal airway      Successful airway: ETT  Cuffed: yes   Successful intubation technique: direct laryngoscopy  Facilitating devices/methods: intubating stylet  Endotracheal tube insertion site: oral  Blade: Aixa  Blade size: 3.5.  ETT size: 7.5 mm  Cormack-Lehane Classification: grade I - full view of glottis  Placement verified by: chest auscultation and capnometry   Cuff volume (mL): 8  Measured from: lips  ETT to lips (cm): 22  Number of attempts at approach: 1    Additional Comments  Easy, atraumatic intubation

## 2017-12-11 NOTE — ANESTHESIA POSTPROCEDURE EVALUATION
Patient: Abdirahman Cheung    Procedure Summary     Date Anesthesia Start Anesthesia Stop Room / Location    12/11/17 5205 1537  [facility] OR 07 / [facility] OR       Procedure Diagnosis Surgeon Provider    KYPHOPLASTY 3-4 LEVELS T12, L1, L2, L3 kyphoplasty with vertebral body biopsy and radiofrequency ablation (N/A Spine Lumbar) Compression fracture of vertebra, initial encounter  (Compression fracture of vertebra, initial encounter [M48.50XA]) MD Homero Damon CRNA          Anesthesia Type: general  Last vitals  BP   104/63 (12/11/17 1009)   Temp   99.1 °F (37.3 °C) (12/11/17 1009)   Pulse   67 (12/11/17 1254)   Resp   18 (12/11/17 1254)     SpO2   99 % (12/11/17 1254)     Post Anesthesia Care and Evaluation    Patient location during evaluation: PACU  Patient participation: complete - patient participated  Level of consciousness: awake and alert  Pain score: 1  Pain management: adequate  Airway patency: patent  Anesthetic complications: No anesthetic complications    Cardiovascular status: acceptable  Respiratory status: room air  Hydration status: acceptable    Blood pressure 104/63, pulse 67, temperature 99.1 °F (37.3 °C), temperature source Temporal Artery , resp. rate 18, SpO2 99 %.

## 2017-12-11 NOTE — OP NOTE
Procedure Note  Preop Diagnosis: Compression fracture of vertebra, initial encounter [M48.50XA]    Post-Op Diagnosis Codes:     * Compression fracture of vertebra, initial encounter [M48.50XA]     Procedure Name: T12, L1, L2, L3 vertebral body biopsy  T12, L1, L2, L3 radiofrequency ablation treatment  T12, L1, L2, L3 kyphoplasty  Indications:  A MRI revealed findings of pathologic metastatic compression fractures. The patient now presents for radiofrequency treatment and kyphoplasty after discussing therapeutic alternatives.          Surgeon: Agapito Engle MD     Assistants:     Anesthesia: General endotracheal anesthesia    ASA Class: 3    Procedure Details   After obtaining informed consent, having the risks and benefits of the procedure explained including but not limited to infection, bleeding, paralysis, spinal fluid leak, bowel bladder incontinence, extravasation of kyphoplasty cement resulting in neural compression, pulmonary embolism, stroke, coma, and death.  The patient was brought to the operating room.  He was given general anesthesia via an endotracheal tube.  He was flipped prone onto a Bear axis table.  Portable fluoroscopy views used to localize levels of T12 through L 3.  Preplan incisions to the left and right of midline were marked with indelible marker.  The patient was then prepped and draped in the standard sterile fashion.  The preplan incisions were then infiltrated Marcaine and epinephrine.  10 blade scalpel was used to make an incision to the dermis and epidermis each level.  Jamshidi needles were advanced through the pedicles at T12 and L1 under direct fluoroscopic guidance.  Biopsy samples were obtained under direct fluoroscopic guidance.  Hand drills were then used to drill channel to the fracture vertebral bodies at T12 and L1.  Radiofrequency treatments and delivered to these vertebral bodies under direct fluoroscopic guidance.  Kyphoplasty balloons were then inserted into the  fractured vertebral bodies inflated deflated and removed under direct fluoroscopic guidance.  An approximately 8 mL of kyphoplasty cement were injected into each vertebral body under direct fluoroscopic guidance.  The syringes were removed.  Jamshidi needles were advanced to the pedicles at L2 and L3 under direct fluoroscopic guidance..Biopsy samples were obtained under direct fluoroscopic guidance.  Hand drills were then used to drill channel to the fracture vertebral bodies at L2 and L3.  Radiofrequency treatments and delivered to these vertebral bodies under direct fluoroscopic guidance.  Kyphoplasty balloons were then inserted into the fractured vertebral bodies inflated deflated and removed under direct fluoroscopic guidance.  An approximately 8 mL of kyphoplasty cement were injected into each vertebral body under direct fluoroscopic guidance.  The syringes were removed.  The wounds were then irrigated with antibiotic solution close the 2 Steri-Strips apiece.  All sponge needle and enhancement counts were correct at the end of the procedure.  The patient was extubated in stable condition returned recovery with about 50 mL of blood loss.    Findings:  Pathologic metastatic compression fractures]    Estimated Blood Loss:  50           Drains:            Total IV Fluids: ml           Specimens:   ID Type Source Tests Collected by Time Destination   A : L1 Bone Spine, Lumbar TISSUE EXAM Agapito Engle MD 12/11/2017 1409    B : T12 Bone Spine, Thoracic SURGICAL PATHOLOGY EXAM Agapito Engle MD 12/11/2017 1409    D : L3 Bone Spine, Lumbar TISSUE EXAM Agapito Engle MD 12/11/2017 1410               Implants:   Implant Name Type Inv. Item Serial No.  Lot No. LRB No. Used   KT MIXER KYPHON W/CMT BONE KYPHX HV R - [id number] Implant KT MIXER KYPHON W/CMT BONE KYPHX HV R  [employer] [id number] N/A 2   DEV BONE FILLER CRV 13GA - [id number] Implant DEV BONE FILLER CRV 13GA   [employer]   N/A 2               Complications:  None           Disposition: PACU - hemodynamically stable.           Condition: stable        Agapito Engle MD

## 2017-12-12 VITALS
WEIGHT: 155 LBS | BODY MASS INDEX: 23.49 KG/M2 | OXYGEN SATURATION: 96 % | HEIGHT: 68 IN | DIASTOLIC BLOOD PRESSURE: 44 MMHG | HEART RATE: 54 BPM | TEMPERATURE: 98.2 F | RESPIRATION RATE: 18 BRPM | SYSTOLIC BLOOD PRESSURE: 94 MMHG

## 2017-12-12 PROCEDURE — G0378 HOSPITAL OBSERVATION PER HR: HCPCS

## 2017-12-12 PROCEDURE — G8978 MOBILITY CURRENT STATUS: HCPCS | Performed by: PHYSICAL THERAPIST

## 2017-12-12 PROCEDURE — G8987 SELF CARE CURRENT STATUS: HCPCS | Performed by: OCCUPATIONAL THERAPIST

## 2017-12-12 PROCEDURE — 97116 GAIT TRAINING THERAPY: CPT

## 2017-12-12 PROCEDURE — 97166 OT EVAL MOD COMPLEX 45 MIN: CPT | Performed by: OCCUPATIONAL THERAPIST

## 2017-12-12 PROCEDURE — 99024 POSTOP FOLLOW-UP VISIT: CPT | Performed by: NURSE PRACTITIONER

## 2017-12-12 PROCEDURE — 25010000003 CEFAZOLIN 1 GM/50ML SOLUTION: Performed by: NEUROLOGICAL SURGERY

## 2017-12-12 PROCEDURE — 97162 PT EVAL MOD COMPLEX 30 MIN: CPT | Performed by: PHYSICAL THERAPIST

## 2017-12-12 PROCEDURE — 97110 THERAPEUTIC EXERCISES: CPT

## 2017-12-12 PROCEDURE — 94799 UNLISTED PULMONARY SVC/PX: CPT

## 2017-12-12 PROCEDURE — G8988 SELF CARE GOAL STATUS: HCPCS | Performed by: OCCUPATIONAL THERAPIST

## 2017-12-12 PROCEDURE — G8979 MOBILITY GOAL STATUS: HCPCS | Performed by: PHYSICAL THERAPIST

## 2017-12-12 RX ORDER — HYDROCODONE BITARTRATE AND ACETAMINOPHEN 7.5; 325 MG/1; MG/1
1 TABLET ORAL EVERY 4 HOURS PRN
Qty: 180 TABLET | Refills: 0 | Status: SHIPPED | OUTPATIENT
Start: 2017-12-12 | End: 2018-03-08 | Stop reason: DRUGHIGH

## 2017-12-12 RX ORDER — HYDROCODONE BITARTRATE AND ACETAMINOPHEN 7.5; 325 MG/1; MG/1
TABLET ORAL
Status: DISCONTINUED
Start: 2017-12-12 | End: 2017-12-12 | Stop reason: HOSPADM

## 2017-12-12 RX ADMIN — HYDROCODONE BITARTRATE AND ACETAMINOPHEN 1 TABLET: 7.5; 325 TABLET ORAL at 05:43

## 2017-12-12 RX ADMIN — CARBAMAZEPINE 400 MG: 200 TABLET ORAL at 08:43

## 2017-12-12 RX ADMIN — HYDROCODONE BITARTRATE AND ACETAMINOPHEN 1 TABLET: 7.5; 325 TABLET ORAL at 10:48

## 2017-12-12 RX ADMIN — CEFAZOLIN SODIUM 1 G: 1 INJECTION, SOLUTION INTRAVENOUS at 05:43

## 2017-12-12 RX ADMIN — HYDROCODONE BITARTRATE AND ACETAMINOPHEN 1 TABLET: 7.5; 325 TABLET ORAL at 01:37

## 2017-12-12 RX ADMIN — SODIUM CHLORIDE 75 ML/HR: 9 INJECTION, SOLUTION INTRAVENOUS at 05:43

## 2017-12-12 NOTE — THERAPY TREATMENT NOTE
Acute Care - Physical Therapy Treatment Note  [facility]     Patient Name: Abdirahman Cheung  : 1945  MRN: [id number]  Today's Date: 2017  Onset of Illness/Injury or Date of Surgery Date: 17  Date of Referral to PT: 17  Referring Physician: Dr. Engle    Admit Date: 2017    Visit Dx:    ICD-10-CM ICD-9-CM   1. Compression fracture of vertebra, initial encounter M48.50XA 805.8   2. Impaired functional mobility, balance, gait, and endurance Z74.09 V49.89   3. Impaired mobility and ADLs Z74.09 799.89     Patient Active Problem List   Diagnosis   • Closed compression fracture of first lumbar vertebra   • Closed compression fracture of L3 lumbar vertebra   • T12 compression fracture   • Normal body mass index (BMI)   • Former smoker   • Respiratory decompensation   • Vertebral compression fracture   • Compression fracture of spine               Adult Rehabilitation Note       17 1401          Rehab Assessment/Intervention    Discipline physical therapy assistant  -      Document Type therapy note (daily note)  -LG      Subjective Information agree to therapy;complains of;weakness;fatigue;pain  -LG      Precautions/Limitations fall precautions;spinal precautions;oxygen therapy device and L/min  -LG      Recorded by [LG] Monroe Cowart PTA      Vital Signs    O2 Delivery Pre Treatment supplemental O2  -LG      O2 Delivery Intra Treatment supplemental O2  -LG      O2 Delivery Post Treatment supplemental O2  -LG      Recorded by [LG] Monroe Cowart PTA      Pain Assessment    Pain Assessment 0-10  -LG      Pain Score 8  -LG      Post Pain Score 8  -LG      Pain Type Acute pain  -LG      Pain Location Back  -LG      Pain Intervention(s) Medication (See MAR);Repositioned;Ambulation/increased activity  -LG      Response to Interventions tolerated  -LG      Recorded by [LG] Monroe Cowart PTA      Bed Mobility, Assessment/Treatment    Bed Mobility, Comment up in chair  -LG      Recorded by [LG]  Monroe Cowart PTA      Transfer Assessment/Treatment    Transfers, Sit-Stand Hunter contact guard assist  -LG      Transfers, Stand-Sit Hunter contact guard assist  -LG      Transfers, Sit-Stand-Sit, Assist Device straight cane  -LG      Recorded by [LG] Monroe Cowart PTA      Gait Assessment/Treatment    Gait, Impairments pain  -LG      Recorded by [LG] Monroe Cowart PTA      Therapy Exercises    Bilateral Lower Extremities AROM:;20 reps;sitting;ankle pumps/circles;LAQ  -LG      Recorded by [LG] Monroe Cowart PTA      Positioning and Restraints    Pre-Treatment Position sitting in chair/recliner  -LG      Post Treatment Position chair  -LG      In Chair sitting;call light within reach;encouraged to call for assist;with family/caregiver;with nsg  -LG      Recorded by [LG] Monroe Cowart PTA        User Key  (r) = Recorded By, (t) = Taken By, (c) = Cosigned By    Initials Name Effective Dates    LG Monroe Cowart PTA 08/02/16 -                 IP PT Goals       12/12/17 1150          Bed Mobility PT LTG    Bed Mobility PT LTG, Date Established 12/12/17  -MS      Bed Mobility PT LTG, Time to Achieve by discharge  -MS      Bed Mobility PT LTG, Activity Type all bed mobility  -MS      Bed Mobility PT LTG, Hunter Level independent  -MS      Transfer Training PT LTG    Transfer Training PT LTG, Date Established 12/12/17  -MS      Transfer Training PT LTG, Time to Achieve by discharge  -MS      Transfer Training PT LTG, Activity Type all transfers  -MS      Transfer Training PT LTG, Hunter Level supervision required  -MS      Transfer Training PT LTG, Assist Device cane, straight  -MS      Gait Training PT LTG    Gait Training Goal PT LTG, Date Established 12/12/17  -MS      Gait Training Goal PT LTG, Time to Achieve by discharge  -MS      Gait Training Goal PT LTG, Hunter Level supervision required  -MS      Gait Training Goal PT LTG, Assist Device cane, straight  -MS      Gait Training Goal PT  LTG, Distance to Achieve 150ft  -MS      Cardiopulmonary PT LTG    Cardiopulmonary PT LTG, Date Established 12/12/17  -MS      Cardiopulmonary PT LTG, Time to Achieve by discharge  -MS      Cardiopulmonary PT LTG, Additional Goal pt maintain O2 saturation at or above 90% on 2L supplemental O2 when walking  -MS        User Key  (r) = Recorded By, (t) = Taken By, (c) = Cosigned By    Initials Name Provider Type    MS Elina Ramirez, PT DPT Physical Therapist          Physical Therapy Education     Title: PT OT SLP Therapies (Active)     Topic: Physical Therapy (Active)     Point: Mobility training (Active)    Learning Progress Summary    Learner Readiness Method Response Comment Documented by Status   Patient Acceptance E,TB,D NR pt unable to remember spinal restrictions. role of PT in his care MS 12/12/17 1149 Active               Point: Precautions (Active)    Learning Progress Summary    Learner Readiness Method Response Comment Documented by Status   Patient Acceptance E,TB,D NR pt unable to remember spinal restrictions. role of PT in his care MS 12/12/17 1149 Active                      User Key     Initials Effective Dates Name Provider Type Discipline    MS 08/02/16 -  Elina Ramirez, PT DPT Physical Therapist PT                    PT Recommendation and Plan  Anticipated Discharge Disposition: home with assist, home with home health  Planned Therapy Interventions: balance training, bed mobility training, gait training, patient/family education, transfer training (activity endurance)  PT Frequency: 2 times/day             Outcome Measures       12/12/17 1300 12/12/17 1100       How much help from another person do you currently need...    Turning from your back to your side while in flat bed without using bedrails?  3  -MS     Moving from lying on back to sitting on the side of a flat bed without bedrails?  3  -MS     Moving to and from a bed to a chair (including a wheelchair)?  3  -MS     Standing up from a  chair using your arms (e.g., wheelchair, bedside chair)?  3  -MS     Climbing 3-5 steps with a railing?  2  -MS     To walk in hospital room?  3  -MS     AM-PAC 6 Clicks Score  17  -MS     How much help from another is currently needed...    Putting on and taking off regular lower body clothing? 2  -MM      Bathing (including washing, rinsing, and drying) 2  -MM      Toileting (which includes using toilet bed pan or urinal) 3  -MM      Putting on and taking off regular upper body clothing 3  -MM      Taking care of personal grooming (such as brushing teeth) 4  -MM      Eating meals 4  -MM      Score 18  -MM      Functional Assessment    Outcome Measure Options AM-PAC 6 Clicks Daily Activity (OT)  -MM AM-PAC 6 Clicks Basic Mobility (PT)  -MS       User Key  (r) = Recorded By, (t) = Taken By, (c) = Cosigned By    Initials Name Provider Type    MS Elina Ramirez, PT DPT Physical Therapist    MM Alireza Maravilla, OTR/L Occupational Therapist           Time Calculation:         PT Charges       12/12/17 1401 12/12/17 1155       Time Calculation    Start Time 1401  -LG 1100  -MS     Stop Time 1424  -LG 1143  -MS     Time Calculation (min) 23 min  -LG 43 min  -MS     PT Received On 12/12/17  -LG 12/12/17  -MS     PT Goal Re-Cert Due Date 12/22/17  -LG 12/22/17  -MS     Time Calculation- PT    Total Timed Code Minutes- PT 23 minute(s)  -LG        User Key  (r) = Recorded By, (t) = Taken By, (c) = Cosigned By    Initials Name Provider Type     Monroe Cowart PTA Physical Therapy Assistant    MS Elina Ramirez, PT DPT Physical Therapist          Therapy Charges for Today     Code Description Service Date Service Provider Modifiers Qty    46269565515 HC GAIT TRAINING EA 15 MIN 12/12/2017 Monroe Cowart PTA GP, KX 1    [id number] HC PT THER PROC EA 15 MIN 12/12/2017 Monroe Cowart PTA GP, KX 1          PT G-Codes  Outcome Measure Options: AM-PAC 6 Clicks Daily Activity (OT)  Score: 17  Functional Limitation: Mobility: Walking  and moving around  Mobility: Walking and Moving Around Current Status (): At least 40 percent but less than 60 percent impaired, limited or restricted  Mobility: Walking and Moving Around Goal Status (): At least 20 percent but less than 40 percent impaired, limited or restricted    Monroe Cowart, PTA  12/12/2017

## 2017-12-12 NOTE — THERAPY EVALUATION
Acute Care - Physical Therapy Initial Evaluation  [facility]     Patient Name: Abdirahman Cheung  : 1945  MRN: [id number]  Today's Date: 2017   Onset of Illness/Injury or Date of Surgery Date: 17  Date of Referral to PT: 17  Referring Physician: Dr. Engle      Admit Date: 2017     Visit Dx:    ICD-10-CM ICD-9-CM   1. Compression fracture of vertebra, initial encounter M48.50XA 805.8   2. Impaired functional mobility, balance, gait, and endurance Z74.09 V49.89     Patient Active Problem List   Diagnosis   • Closed compression fracture of first lumbar vertebra   • Closed compression fracture of L3 lumbar vertebra   • T12 compression fracture   • Normal body mass index (BMI)   • Former smoker   • Respiratory decompensation   • Vertebral compression fracture   • Compression fracture of spine     Past Medical History:   Diagnosis Date   • Accessory skeletal muscle     WHILE BREATHING   • Arthritis    • Back pain    • Cancer     brain, lung and nose ON RADIATION TREATMENT   • COPD (chronic obstructive pulmonary disease)    • Coronary artery disease    • Dizziness    • Emphysema lung    • Fatigue    • Fracture     THORACIC   • Heart trouble    • History of transfusion    • Hypertension    • MI, old    • On home oxygen therapy     2.5 LITERS PER NC   • Shortness of breath    • Tic [place]    • Weakness of extremity    • Wheelchair dependent      Past Surgical History:   Procedure Laterality Date   • COLON SURGERY      Had to have a section of his colon removed   • CORONARY ANEURYSM REPAIR     • CORONARY ANGIOPLASTY WITH STENT PLACEMENT     • KYPHOPLASTY N/A 2017    Procedure: KYPHOPLASTY 3-4 LEVELS T12, L1, L2, L3 kyphoplasty with vertebral body biopsy and radiofrequency ablation;  Surgeon: Agapito Engle MD;  Location: [facility];  Service:    • REVISION / TAKEDOWN COLOSTOMY            PT ASSESSMENT (last 72 hours)      PT Evaluation       17 1100 17 1821    Rehab  Evaluation    Document Type evaluation  -MS     Subjective Information agree to therapy;complains of;pain  -MS     General Information    Patient Profile Review yes  -MS     Onset of Illness/Injury or Date of Surgery Date 12/11/17  -MS     Referring Physician Dr. Engle  -MS     General Observations pt fowlers in bed, awake and alert with 2L O2 per NC. Seems to have difficculty with breathing  -MS     Pertinent History Of Current Problem s/p kyphoplasty T12-L3 with biopsy and radiofrequentcy ablation due to pathlogic metastatic compression fxs, lung ca  -MS     Precautions/Limitations fall precautions;spinal precautions  -MS     Prior Level of Function independent:;all household mobility;ADL's   wc for longer distances  -MS     Plans/Goals Discussed With patient;agreed upon  -MS     Risks Reviewed patient:;LOB;dizziness;increased discomfort;change in vital signs  -MS     Benefits Reviewed patient:;improve function;increase independence;increase strength;increase balance;decrease pain;increase knowledge  -MS     Barriers to Rehab medically complex;previous functional deficit;physical barrier  -MS     Living Environment    Lives With  spouse  -    Living Arrangements  mobile home  -    Home Accessibility stairs to enter home  -MS bed and bath on same level  -    Number of Stairs to Enter Home 2  -MS     Stair Railings at Home outside, present at both sides  -MS outside, present at both sides  -    Type of Financial/Environmental Concern  none  -    Transportation Available  car  -    Clinical Impression    Date of Referral to PT 12/11/17  -MS     Patient/Family Goals Statement return home and decrease pain  -MS     Criteria for Skilled Therapeutic Interventions Met yes;treatment indicated  -MS     Pathology/Pathophysiology Noted (Describe Specifically for Each System) musculoskeletal  -MS     Impairments Found (describe specific impairments) gait, locomotion, and balance  -MS     Rehab Potential good, to  "achieve stated therapy goals  -MS     Predicted Duration of Therapy Intervention (days/wks) until discharge  -MS     Vital Signs    Pre SpO2 (%) 95  -MS     O2 Delivery Pre Treatment supplemental O2   2L  -MS     O2 Delivery Intra Treatment supplemental O2  -MS     Post SpO2 (%) 90  -MS     O2 Delivery Post Treatment supplemental O2   2L  -MS     Pain Assessment    Pain Assessment 0-10  -MS     Pain Score 8  -MS     Pain Type Acute pain  -MS     Pain Location Back  -MS     Pain Radiating Towards \"starts in the middle and goes all the way down\"  -MS     Pain Descriptors --   \"terrible\" \"hurts bad\"  -MS     Pain Intervention(s) Medication (See MAR);Repositioned;Ambulation/increased activity  -MS     Response to Interventions recieved pain pills just prior to eval, no change in pain level with sitting or walking  -MS     Cognitive Assessment/Intervention    Current Cognitive/Communication Assessment functional  -MS     Orientation Status oriented x 4  -MS     Follows Commands/Answers Questions 100% of the time  -MS     Personal Safety WNL/WFL  -MS     Personal Safety Interventions fall prevention program maintained;gait belt;muscle strengthening facilitated;nonskid shoes/slippers when out of bed;supervised activity  -MS     ROM (Range of Motion)    General ROM no range of motion deficits identified  -MS     MMT (Manual Muscle Testing)    General MMT Assessment no strength deficits identified  -MS     Bed Mobility, Assessment/Treatment    Bed Mobility, Assistive Device bed rails;head of bed elevated  -MS     Bed Mob, Supine to Sit, Loving contact guard assist;supervision required;verbal cues required  -MS     Transfer Assessment/Treatment    Transfers, Sit-Stand Loving contact guard assist;supervision required;verbal cues required  -MS     Transfers, Stand-Sit Loving contact guard assist;verbal cues required;nonverbal cues required (demo/gesture)  -MS     Transfers, Sit-Stand-Sit, Assist Device " straight cane  -MS     Transfer, Impairments pain  -MS     Gait Assessment/Treatment    Gait, [place] Level contact guard assist;verbal cues required;nonverbal cues required (demo/gesture)  -MS     Gait, Assistive Device straight cane  -MS     Gait, Distance (Feet) 75  -MS     Gait, Impairments pain  -MS     Gait, Comment pt walks with R side lagging behind. This is most likely a chronic gait pattern and not due to his current situation  -MS     Motor Skills/Interventions    Additional Documentation Balance Skills Training (Group);Gross Motor Coordination Training (Group)  -MS     Balance Skills Training    Sitting-Level of Assistance Close supervision  -MS     Sitting-Balance Support Right upper extremity supported;Left upper extremity supported   to decrease pain  -MS     Standing-Level of Assistance Contact guard  -MS     Static Standing Balance Support assistive device  -MS     Gait Balance-Level of Assistance Contact guard  -MS     Gait Balance Support assistive device  -MS     Gross Motor Coordination Training    Gross Motor Skill, Impairments Detail grossly intact for all extremities  -MS     Sensory Assessment/Intervention    Sensory Impairment --   intact per pt  -MS     Positioning and Restraints    Post Treatment Position chair  -MS     In Chair notified nsg;sitting;call light within reach;encouraged to call for assist  -MS       12/11/17 6703       General Information    Equipment Currently Used at Home none;cane, straight;walker, rolling;shower chair;commode  -KP       User Key  (r) = Recorded By, (t) = Taken By, (c) = Cosigned By    Initials Name Provider Type    MS Elina Ramirez, PT DPT Physical Therapist     Ar Mehta, RN Registered Nurse          Physical Therapy Education     Title: PT OT SLP Therapies (Active)     Topic: Physical Therapy (Active)     Point: Mobility training (Active)    Learning Progress Summary    Learner Readiness Method Response Comment Documented by Status    Patient Acceptance E,TB,D NR pt unable to remember spinal restrictions. role of PT in his care MS 12/12/17 1149 Active               Point: Precautions (Active)    Learning Progress Summary    Learner Readiness Method Response Comment Documented by Status   Patient Acceptance E,TB,D NR pt unable to remember spinal restrictions. role of PT in his care MS 12/12/17 1149 Active                      User Key     Initials Effective Dates Name Provider Type Discipline    MS 08/02/16 -  Elina Ramirez, PT DPT Physical Therapist PT                PT Recommendation and Plan  Anticipated Discharge Disposition: home with assist, home with home health  Planned Therapy Interventions: balance training, bed mobility training, gait training, patient/family education, transfer training (activity endurance)  PT Frequency: 2 times/day  Plan of Care Review  Plan Of Care Reviewed With: patient  Progress: progress toward functional goals as expected  Outcome Summary/Follow up Plan: PT evaluation completed. The patient recieved pain medication just prior to the evaluation, and he did not report any increase in pain with increased movement. His pain was rated at an 8/10 the entire time. He demonstrates heavy breathing even at rest, but he maintained his O2 sat at 90% when walking. He is slightly unsteady which is probably not far from his baseline. PT will continue to work withthe patient to help decrease his pain, increase his independence,  and increase activity tolerance.           IP PT Goals       12/12/17 1150          Bed Mobility PT LTG    Bed Mobility PT LTG, Date Established 12/12/17  -MS      Bed Mobility PT LTG, Time to Achieve by discharge  -MS      Bed Mobility PT LTG, Activity Type all bed mobility  -MS      Bed Mobility PT LTG, [place] Level independent  -MS      Transfer Training PT LTG    Transfer Training PT LTG, Date Established 12/12/17  -MS      Transfer Training PT LTG, Time to Achieve by discharge  -MS       Transfer Training PT LTG, Activity Type all transfers  -MS      Transfer Training PT LTG, [place] Level supervision required  -MS      Transfer Training PT LTG, Assist Device cane, straight  -MS      Gait Training PT LTG    Gait Training Goal PT LTG, Date Established 12/12/17  -MS      Gait Training Goal PT LTG, Time to Achieve by discharge  -MS      Gait Training Goal PT LTG, [place] Level supervision required  -MS      Gait Training Goal PT LTG, Assist Device cane, straight  -MS      Gait Training Goal PT LTG, Distance to Achieve 150ft  -MS      Cardiopulmonary PT LTG    Cardiopulmonary PT LTG, Date Established 12/12/17  -MS      Cardiopulmonary PT LTG, Time to Achieve by discharge  -MS      Cardiopulmonary PT LTG, Additional Goal pt maintain O2 saturation at or above 90% on 2L supplemental O2 when walking  -MS        User Key  (r) = Recorded By, (t) = Taken By, (c) = Cosigned By    Initials Name Provider Type    MS Elina Ramirez, PT DPT Physical Therapist                Outcome Measures       12/12/17 1100          How much help from another person do you currently need...    Turning from your back to your side while in flat bed without using bedrails? 3  -MS      Moving from lying on back to sitting on the side of a flat bed without bedrails? 3  -MS      Moving to and from a bed to a chair (including a wheelchair)? 3  -MS      Standing up from a chair using your arms (e.g., wheelchair, bedside chair)? 3  -MS      Climbing 3-5 steps with a railing? 2  -MS      To walk in hospital room? 3  -MS      AM-PAC 6 Clicks Score 17  -MS      Functional Assessment    Outcome Measure Options AM-PAC 6 Clicks Basic Mobility (PT)  -MS        User Key  (r) = Recorded By, (t) = Taken By, (c) = Cosigned By    Initials Name Provider Type    MS Elina Ramirez, PT DPT Physical Therapist           Time Calculation:         PT Charges       12/12/17 1155          Time Calculation    Start Time 1100  -MS      Stop Time 1143   -MS      Time Calculation (min) 43 min  -MS      PT Received On 12/12/17  -MS      PT Goal Re-Cert Due Date 12/22/17  -MS        User Key  (r) = Recorded By, (t) = Taken By, (c) = Cosigned By    Initials Name Provider Type    MS Elina Ramirez, PT DPT Physical Therapist          Therapy Charges for Today     Code Description Service Date Service Provider Modifiers Qty    68362564661 HC PT MOBILITY CURRENT 12/12/2017 Elina Ramirez, PT DPT GP, CK 1    20171411964 HC PT MOBILITY PROJECTED 12/12/2017 Elina Ramirez, PT DPT GP, CJ 1    [id number] HC PT EVAL MOD COMPLEXITY 3 12/12/2017 Elina Ramirez, PT DPT GP 1          PT G-Codes  Outcome Measure Options: AM-PAC 6 Clicks Basic Mobility (PT)  Score: 17  Functional Limitation: Mobility: Walking and moving around  Mobility: Walking and Moving Around Current Status (): At least 40 percent but less than 60 percent impaired, limited or restricted  Mobility: Walking and Moving Around Goal Status (): At least 20 percent but less than 40 percent impaired, limited or restricted      Elina Ramirez, PT DPT  12/12/2017

## 2017-12-12 NOTE — DISCHARGE SUMMARY
Date of Discharge:  12/12/2017    Discharge Diagnosis: Thoracic and lumbar compression fractures    Presenting Problem/History of Present Illness  Compression fracture of vertebra, initial encounter [M48.50XA]  Compression fracture of spine [M48.50XA]       Hospital Course  Patient is a 72 y.o. male presented with back pain.  This on that the patient did have vertebral body fractures at T 12, L1, and L3.  There is also question of metastasis to these levels as he does have a history of cancer.  It was felt that the patient would benefit from going to the operating room for kyphoplasty.  This was done on 12/11/2017 at T12, L1, L2, L3 along with vertebral body biopsy and radiofrequency ablation.  Patient tolerated procedure well.  He was transferred to the neuro floor where his monitored with every 4 hours vital signs, every 4 hours neuro checks.  Currently this on the patient's awake alert and oriented ×3.  He is tolerating by mouth.  He is voiding spontaneous.  He is ambulating.  It is felt this and the patient is stable and suitable to be discharged home.    Procedures Performed  Procedure(s):  KYPHOPLASTY 3-4 LEVELS T12, L1, L2, L3 kyphoplasty with vertebral body biopsy and radiofrequency ablation       Consults:   Consults     No orders found for last 30 day(s).              Condition on Discharge:  Stable    Vital Signs  Temp:  [97.6 °F (36.4 °C)-98.7 °F (37.1 °C)] 97.6 °F (36.4 °C)  Heart Rate:  [] 67  Resp:  [14-20] 16  BP: (103-177)/(50-94) 103/50    Physical Exam:   Physical Exam   Constitutional: He is oriented to person, place, and time. He appears well-developed and well-nourished.   HENT:   Head: Normocephalic.   Eyes: EOM are normal. Pupils are equal, round, and reactive to light.   Neck: Normal range of motion.   Pulmonary/Chest: Effort normal.   Musculoskeletal: Normal range of motion.   Neurological: He is alert and oriented to person, place, and time. He has normal strength and normal  reflexes. No cranial nerve deficit or sensory deficit. Gait normal. GCS eye subscore is 4. GCS verbal subscore is 5. GCS motor subscore is 6.   Skin: Skin is warm.   Incision clean dry and intact   Psychiatric: He has a normal mood and affect. His speech is normal and behavior is normal. Thought content normal.        Neurologic Exam     Mental Status   Oriented to person, place, and time.   Speech: speech is normal     Cranial Nerves     CN III, IV, VI   Pupils are equal, round, and reactive to light.  Extraocular motions are normal.     Motor Exam     Strength   Strength 5/5 throughout.          Discharge Disposition  Home or Self Care    Discharge Medications   Abdirahman Cheung   Home Medication Instructions DAIJA:[id number]    Printed on:12/12/17 9480   Medication Information                      Calcium Carbonate-Vitamin D (CALTRATE 600+D PO)  Take 1 tablet by mouth 2 (Two) Times a Day. WITH VITAMIN D3             carBAMazepine (TEGretol) 200 MG tablet  Take 400 mg by mouth 2 (Two) Times a Day.             FOLBEE 2.5-25-1 MG tablet tablet  Take 1 tablet by mouth Daily.             HYDROcodone-acetaminophen (NORCO) 7.5-325 MG per tablet  Take 1 tablet by mouth Every 4 (Four) Hours As Needed (BACK PAIN).             magnesium oxide (MAG-OX) 400 MG tablet  Take 400 mg by mouth 2 (Two) Times a Day.             megestrol (MEGACE) 40 MG tablet  Take 40 mg by mouth every night at bedtime.             metoprolol tartrate (LOPRESSOR) 25 MG tablet  Take 12.5 mg by mouth 2 (Two) Times a Day.             Multiple Vitamins-Minerals (MULTIVITAMIN ADULT PO)  Take 1 tablet by mouth Daily.             nitroglycerin (NITROSTAT) 0.4 MG SL tablet  PLACE 1 TABLET UNDER THE TONGUE EVERY 5 MINUTES AS NEEDED FOR CHEST PAIN             pravastatin (PRAVACHOL) 10 MG tablet  Take 10 mg by mouth Every Night.                 Discharge Diet:   Diet Instructions     Advance Diet As Tolerated                   Regular diet.                   Activity at Discharge:   Activity Instructions     Discharge Activity Restrictions       1) No driving for 1 week and no longer taking narcotics.     3) May shower / sponge bathe for 48  hours.  4) Do not lift, push, or pull           Additional Activity Instructions:      No bending, lifting, or twisting.  Do not lift more than 10 pounds.                  Follow-up Appointments  No future appointments.  Additional Instructions for the Follow-ups that You Need to Schedule     Call MD With Problems / Concerns    As directed    Call with worsening back or leg pain, drainage from wound, fever, or difficulty walking   Order Comments:  Call with worsening back or leg pain, drainage from wound, fever, or difficulty walking            Discharge Follow-up with Specified Provider: shavonne daigle np; 3 Weeks    As directed    To:  shavonne daigle np    Follow Up:  3 Weeks                     Test Results Pending at Discharge   Order Current Status    Surgical Pathology Exam - Bone, Spine, Thoracic In process    Tissue Pathology Exam - Bone, Spine, Lumbar In process           Shavonne Daigle, CLEVE  12/12/17  1:31 PM    Time: Discharge 30 min

## 2017-12-12 NOTE — THERAPY EVALUATION
Acute Care - Occupational Therapy Initial Evaluation  [facility]     Patient Name: Abdirahman Cheung  : 1945  MRN: [id number]  Today's Date: 2017  Onset of Illness/Injury or Date of Surgery Date: 17  Date of Referral to OT: 17  Referring Physician: Dr. Engle    Admit Date: 2017       ICD-10-CM ICD-9-CM   1. Compression fracture of vertebra, initial encounter M48.50XA 805.8   2. Impaired functional mobility, balance, gait, and endurance Z74.09 V49.89   3. Impaired mobility and ADLs Z74.09 799.89     Patient Active Problem List   Diagnosis   • Closed compression fracture of first lumbar vertebra   • Closed compression fracture of L3 lumbar vertebra   • T12 compression fracture   • Normal body mass index (BMI)   • Former smoker   • Respiratory decompensation   • Vertebral compression fracture   • Compression fracture of spine     Past Medical History:   Diagnosis Date   • Accessory skeletal muscle     WHILE BREATHING   • Arthritis    • Back pain    • Cancer     brain, lung and nose ON RADIATION TREATMENT   • COPD (chronic obstructive pulmonary disease)    • Coronary artery disease    • Dizziness    • Emphysema lung    • Fatigue    • Fracture     THORACIC   • Heart trouble    • History of transfusion    • Hypertension    • MI, old    • On home oxygen therapy     2.5 LITERS PER NC   • Shortness of breath    • Tic [place]ou[place]    • Weakness of extremity    • Wheelchair dependent      Past Surgical History:   Procedure Laterality Date   • COLON SURGERY      Had to have a section of his colon removed   • CORONARY ANEURYSM REPAIR     • CORONARY ANGIOPLASTY WITH STENT PLACEMENT     • KYPHOPLASTY N/A 2017    Procedure: KYPHOPLASTY 3-4 LEVELS T12, L1, L2, L3 kyphoplasty with vertebral body biopsy and radiofrequency ablation;  Surgeon: Agapito Engle MD;  Location: [facility];  Service:    • REVISION / TAKEDOWN COLOSTOMY            OT ASSESSMENT FLOWSHEET (last 72 hours)      OT Evaluation        12/12/17 1110 12/12/17 1100 12/11/17 1821 12/11/17 1813       Rehab Evaluation    Document Type evaluation   see MAR  -MM evaluation  -MS       Subjective Information agree to therapy;complains of;pain  -MM agree to therapy;complains of;pain  -MS       General Information    Patient Profile Review yes  -MM yes  -MS       Onset of Illness/Injury or Date of Surgery Date 12/11/17  -MM 12/11/17  -MS       Referring Physician Dr. Engle  -MM Dr. Engle  -MS       General Observations pt in fowlers in bed, labored breathing but pt reports no difficulty breathing, 2L O2/NC  -MM pt fowlers in bed, awake and alert with 2L O2 per NC. Seems to have difficculty with breathing  -MS       Pertinent History Of Current Problem s/p kyphoplasty T12-L3 with biopsy and radiofrequentcy ablation due to pathlogic metastatic compression fxs, lung ca  -MM s/p kyphoplasty T12-L3 with biopsy and radiofrequentcy ablation due to pathlogic metastatic compression fxs, lung ca  -MS       Precautions/Limitations fall precautions;spinal precautions  -MM fall precautions;spinal precautions  -MS       Prior Level of Function independent:;all household mobility;ADL's   w/c for longer distances  -MM independent:;all household mobility;ADL's   wc for longer distances  -MS       Equipment Currently Used at Home cane, straight;walker, rolling;shower chair;commode  -MM   none;cane, straight;walker, rolling;shower chair;commode  -KP     Plans/Goals Discussed With patient;agreed upon  -MM patient;agreed upon  -MS       Risks Reviewed patient:;LOB;nausea/vomiting;dizziness;increased discomfort;increased drainage;change in vital signs;lines disloged  -MM patient:;LOB;dizziness;increased discomfort;change in vital signs  -MS       Benefits Reviewed patient:;improve function;increase independence;increase strength;decrease pain;increase balance;decrease risk of DVT;improve skin integrity;increase knowledge  -MM patient:;improve function;increase  independence;increase strength;increase balance;decrease pain;increase knowledge  -MS       Barriers to Rehab previous functional deficit;medically complex;physical barrier  -MM medically complex;previous functional deficit;physical barrier  -MS       Living Environment    Lives With spouse  -MM  spouse  -KP      Living Arrangements mobile home  -MM  mobile home  -KP      Home Accessibility stairs to enter home;tub/shower is not walk in  -MM stairs to enter home  -MS bed and bath on same level  -KP      Number of Stairs to Enter Home 2  -MM 2  -MS       Stair Railings at Home outside, present at both sides  -MM outside, present at both sides  -MS outside, present at both sides  -KP      Type of Financial/Environmental Concern   none  -KP      Transportation Available   car  -      Clinical Impression    Date of Referral to OT 12/11/17  -MM        OT Diagnosis impaired mobility and adls  -MM        Impairments Found (describe specific impairments) gait, locomotion, and balance;ergonomics and body mechanics;aerobic capacity/endurance  -MM        Patient/Family Goals Statement build energy to go home this date  -        Criteria for Skilled Therapeutic Interventions Met yes;treatment indicated  -MM        Rehab Potential good, to achieve stated therapy goals  -MM        Therapy Frequency 3-5 times/wk  -MM        Predicted Duration of Therapy Intervention (days/wks) until d/c  -MM        Anticipated Discharge Disposition home with assist;home with home health  -MM        Functional Level Prior    Ambulation    1-->assistive equipment  -KP     Transferring    0-->independent  -KP     Toileting    0-->independent  -KP     Bathing    0-->independent  -KP     Dressing    0-->independent  -KP     Eating    0-->independent  -KP     Communication    0-->understands/communicates without difficulty  -KP     Swallowing    0-->swallows foods/liquids without difficulty  -KP     Vital Signs    Pre SpO2 (%) 95  -MM 95  -MS        "O2 Delivery Pre Treatment supplemental O2  -MM supplemental O2   2L  -MS       O2 Delivery Intra Treatment  supplemental O2  -MS       Post SpO2 (%) 90  -MM 90  -MS       O2 Delivery Post Treatment supplemental O2  -MM supplemental O2   2L  -MS       Pre Patient Position Supine  -MM        Post Patient Position Sitting  -MM        Pain Assessment    Pain Assessment 0-10  -MM 0-10  -MS       Pain Score 8  -MM 8  -MS       Pain Type Acute pain  -MM Acute pain  -MS       Pain Location Back  -MM Back  -MS       Pain Radiating Towards \"starts in the middle and goes all the way down\"  -MM \"starts in the middle and goes all the way down\"  -MS       Pain Descriptors --   \"terrible\"  -MM --   \"terrible\" \"hurts bad\"  -MS       Pain Intervention(s) Medication (See MAR);Repositioned  -MM Medication (See MAR);Repositioned;Ambulation/increased activity  -MS       Response to Interventions tolerated  -MM recieved pain pills just prior to eval, no change in pain level with sitting or walking  -MS       Cognitive Assessment/Intervention    Current Cognitive/Communication Assessment functional  -MM functional  -MS       Orientation Status oriented x 4  -MM oriented x 4  -MS       Follows Commands/Answers Questions 100% of the time;able to follow multi-step instructions  -% of the time  -MS       Personal Safety WNL/WFL  -MM WNL/WFL  -MS       Personal Safety Interventions fall prevention program maintained;gait belt;muscle strengthening facilitated;nonskid shoes/slippers when out of bed;supervised activity  -MM fall prevention program maintained;gait belt;muscle strengthening facilitated;nonskid shoes/slippers when out of bed;supervised activity  -MS       ROM (Range of Motion)    General ROM no range of motion deficits identified  -MM no range of motion deficits identified  -MS       MMT (Manual Muscle Testing)    General MMT Assessment  no strength deficits identified  -MS       General MMT Assessment Detail BUE 4/5 " functionally  -MM        Bed Mobility, Assessment/Treatment    Bed Mobility, Assistive Device bed rails;head of bed elevated  -MM bed rails;head of bed elevated  -MS       Bed Mob, Supine to Sit, [place] contact guard assist;supervision required;verbal cues required  -MM contact guard assist;supervision required;verbal cues required  -MS       Bed Mob, Sit to Supine, [place] not tested   up in chair  -MM        Transfer Assessment/Treatment    Transfers, Sit-Stand [place] contact guard assist;supervision required;verbal cues required  -MM contact guard assist;supervision required;verbal cues required  -MS       Transfers, Stand-Sit [place] contact guard assist;verbal cues required;nonverbal cues required (demo/gesture)  -MM contact guard assist;verbal cues required;nonverbal cues required (demo/gesture)  -MS       Transfers, Sit-Stand-Sit, Assist Device straight cane  -MM straight cane  -MS       Transfer, Impairments pain  -MM pain  -MS       Functional Mobility    Functional Mobility- Ind. Level contact guard assist;verbal cues required  -MM        Functional Mobility- Device straight cane  -MM        Functional Mobility- Comment into beltran  -MM        Toileting Assessment/Training    Toileting Assess/Train, Assistive Device other (see comments)   standard commode  -MM        Toileting Assess/Train, Position standing  -MM        Toileting Assess/Train, Indepen Level contact guard assist;supervision required  -MM        Toileting Assess/Train, Impairments pain  -MM        Motor Skills/Interventions    Additional Documentation Fine Motor Coordination Training (Group);Gross Motor Coordination Training (Group)  -MM Balance Skills Training (Group);Gross Motor Coordination Training (Group)  -MS       Balance Skills Training    Sitting-Level of Assistance  Close supervision  -MS       Sitting-Balance Support  Right upper extremity supported;Left upper extremity supported   to decrease pain  -MS        Standing-Level of Assistance  Contact guard  -MS       Static Standing Balance Support  assistive device  -MS       Gait Balance-Level of Assistance  Contact guard  -MS       Gait Balance Support  assistive device  -MS       Gross Motor Coordination Training    Gross Motor Skill, Impairments Detail finger to nose functionally intact  -MM grossly intact for all extremities  -MS       Sensory Assessment/Intervention    Sensory Impairment --   WNL per pt  -MM --   intact per pt  -MS       General Therapy Interventions    Planned Therapy Interventions activity intolerance;adaptive equipment training;ADL retraining;IADL retraining;balance training;bed mobility training;energy conservation;fine motor coordination training;home exercise program;motor coordination training;strengthening;transfer training  -MM        Positioning and Restraints    Pre-Treatment Position in bed  -MM        Post Treatment Position chair  -MM chair  -MS       In Chair notified nsg;sitting;call light within reach;encouraged to call for assist  -MM notified nsg;sitting;call light within reach;encouraged to call for assist  -MS         User Key  (r) = Recorded By, (t) = Taken By, (c) = Cosigned By    Initials Name Effective Dates    MS Elina Ramirez, PT DPT 08/02/16 -     KP Ar Mehta RN 08/02/16 -     MM Alireza Maravilla, OTR/L 10/21/16 -            Occupational Therapy Education     Title: PT OT SLP Therapies (Active)     Topic: Occupational Therapy (Active)     Point: ADL training (Active)    Description: Instruct learner(s) on proper safety adaptation and remediation techniques during self care or transfers.   Instruct in proper use of assistive devices.    Learning Progress Summary    Learner Readiness Method Response Comment Documented by Status   Patient Acceptance E NR OT role, benefits, POC and spinal precautions. MM 12/12/17 6644 Active               Point: Precautions (Active)    Description: Instruct learner(s) on prescribed  precautions during self-care and functional transfers.    Learning Progress Summary    Learner Readiness Method Response Comment Documented by Status   Patient Acceptance E NR OT role, benefits, POC and spinal precautions.  12/12/17 1324 Active                      User Key     Initials Effective Dates Name Provider Type Discipline     10/21/16 -  Alireza Maravilla, OTR/L Occupational Therapist OT                  OT Recommendation and Plan  Anticipated Discharge Disposition: home with assist, home with home health  Planned Therapy Interventions: activity intolerance, adaptive equipment training, ADL retraining, IADL retraining, balance training, bed mobility training, energy conservation, fine motor coordination training, home exercise program, motor coordination training, strengthening, transfer training  Therapy Frequency: 3-5 times/wk  Plan of Care Review  Plan Of Care Reviewed With: patient  Progress: progress toward functional goals as expected  Outcome Summary/Follow up Plan: OT dov completed. Pt c/o pain 8/10 in back throughout tx. Pt was CGA for bed mobility, t/f and functional mobility with pt's straight cane. Pt was CGA- supervision for toileting. Pt demonstrated heavy breathing but had no reports of SOA and O2 sats remained above 90%. Skilled OT recommended to address ADL, functional mobility, education and pain. Recommended d/c home with assist and HHOT.          OT Goals       12/12/17 1324          Patient Education OT LTG    Patient Education OT LTG, Date Established 12/12/17  -      Patient Education OT LTG, Time to Achieve by discharge  -      Patient Education OT LTG, Education Type precautions per surgeon;brace use/care;home safety;posture/body mechanics;positioning;adaptive equipment mgmt;energy conservation;work simplification;randy/doff brace;adaptive breathing  -      Patient Education OT LTG, Education Understanding demonstrates adequately;verbalizes understanding;independent  -MM       Bathing OT LTG    Bathing Goal OT LTG, Date Established 12/12/17  -MM      Bathing Goal OT LTG, Time to Achieve by discharge  -MM      Bathing Goal OT LTG, Activity Type upper body bathing;lower body bathing  -MM      Bathing Goal OT LTG, [place] Level conditional independence;set up required  -MM      Bathing Goal OT LTG, Assist Device shower head, detachable;shower chair with back;grab bars;sponge, long handled  -MM      Toileting OT LTG    Toileting Goal OT LTG, Date Established 12/12/17  -MM      Toileting Goal OT LTG, Time to Achieve by discharge  -MM      Toileting Goal OT LTG, [place] Level set up required;supervision required  -MM      LB Dressing OT LTG    LB Dressing Goal OT LTG, Date Established 12/12/17  -MM      LB Dressing Goal OT LTG, Time to Achieve by discharge  -MM      LB Dressing Goal OT LTG, [place] Level supervision required;set up required  -MM      LB Dressing Goal OT LTG, Adaptive Equipment laces, elastic;shoe horn, long handled;sock-aid;reacher  -MM        User Key  (r) = Recorded By, (t) = Taken By, (c) = Cosigned By    Initials Name Provider Type    DENA Maravilla, OTR/L Occupational Therapist                Outcome Measures       12/12/17 1300 12/12/17 1100       How much help from another person do you currently need...    Turning from your back to your side while in flat bed without using bedrails?  3  -MS     Moving from lying on back to sitting on the side of a flat bed without bedrails?  3  -MS     Moving to and from a bed to a chair (including a wheelchair)?  3  -MS     Standing up from a chair using your arms (e.g., wheelchair, bedside chair)?  3  -MS     Climbing 3-5 steps with a railing?  2  -MS     To walk in hospital room?  3  -MS     AM-PAC 6 Clicks Score  17  -MS     How much help from another is currently needed...    Putting on and taking off regular lower body clothing? 2  -MM      Bathing (including washing, rinsing, and drying) 2  -MM       Toileting (which includes using toilet bed pan or urinal) 3  -MM      Putting on and taking off regular upper body clothing 3  -MM      Taking care of personal grooming (such as brushing teeth) 4  -MM      Eating meals 4  -MM      Score 18  -MM      Functional Assessment    Outcome Measure Options AM-PAC 6 Clicks Daily Activity (OT)  -MM AM-PAC 6 Clicks Basic Mobility (PT)  -MS       User Key  (r) = Recorded By, (t) = Taken By, (c) = Cosigned By    Initials Name Provider Type    MS Elina Ramirez, PT DPT Physical Therapist    MM Alireza Maravilla OTR/L Occupational Therapist          Time Calculation:   OT Start Time: 1110  OT Stop Time: 1138 (10 min chart review time)  OT Time Calculation (min): 28 min    Therapy Charges for Today     Code Description Service Date Service Provider Modifiers Qty    [id number]  OT SELFCARE CURRENT 12/12/2017 Alireza Maravilla OTR/L GO, CK 1    [id number] HC OT SELFCARE PROJECTED 12/12/2017 Alireza Maravilla OTR/L GO, CI 1    [id number]  OT EVAL MOD COMPLEXITY 3 12/12/2017 Alireza Maravilla OTR/L GO, KX 1          OT G-codes  OT Professional Judgement Used?: Yes  OT Functional Scales Options: AM-PAC 6 Clicks Daily Activity (OT)  Score: 18  Functional Limitation: Self care  Self Care Current Status (): At least 40 percent but less than 60 percent impaired, limited or restricted  Self Care Goal Status (): At least 1 percent but less than 20 percent impaired, limited or restricted    ESTEFANIA Yang/ILIA  12/12/2017

## 2017-12-12 NOTE — PLAN OF CARE
Problem: Patient Care Overview (Adult)  Goal: Plan of Care Review  Outcome: Ongoing (interventions implemented as appropriate)    12/11/17 5717   Coping/Psychosocial Response Interventions   Plan Of Care Reviewed With patient;spouse   Patient Care Overview   Progress no change   Outcome Evaluation   Outcome Summary/Follow up Plan Recieved pt from PACU. Pt alert and c/o pain PRN pain meds givenFamily at bedside.         Problem: Perioperative Period (Adult)  Goal: Signs and Symptoms of Listed Potential Problems Will be Absent or Manageable (Perioperative Period)  Outcome: Ongoing (interventions implemented as appropriate)

## 2017-12-12 NOTE — PLAN OF CARE
Problem: Patient Care Overview (Adult)  Goal: Plan of Care Review  Outcome: Ongoing (interventions implemented as appropriate)    12/12/17 1150   Coping/Psychosocial Response Interventions   Plan Of Care Reviewed With patient   Patient Care Overview   Progress progress toward functional goals as expected   Outcome Evaluation   Outcome Summary/Follow up Plan PT evaluation completed. The patient recieved pain medication just prior to the evaluation, and he did not report any increase in pain with increased movement. His pain was rated at an 8/10 the entire time. He demonstrates heavy breathing even at rest, but he maintained his O2 sat at 90% when walking. He is slightly unsteady which is probably not far from his baseline. PT will continue to work withthe patient to help decrease his pain, increase his independence, and increase activity tolerance.          Problem: Inpatient Physical Therapy  Goal: Bed Mobility Goal LTG- PT  Outcome: Ongoing (interventions implemented as appropriate)    12/12/17 1150   Bed Mobility PT LTG   Bed Mobility PT LTG, Date Established 12/12/17   Bed Mobility PT LTG, Time to Achieve by discharge   Bed Mobility PT LTG, Activity Type all bed mobility   Bed Mobility PT LTG, [place] Level independent       Goal: Transfer Training Goal 1 LTG- PT  Outcome: Ongoing (interventions implemented as appropriate)    12/12/17 1150   Transfer Training PT LTG   Transfer Training PT LTG, Date Established 12/12/17   Transfer Training PT LTG, Time to Achieve by discharge   Transfer Training PT LTG, Activity Type all transfers   Transfer Training PT LTG, [place] Level supervision required   Transfer Training PT LTG, Assist Device cane, straight       Goal: Gait Training Goal LTG- PT  Outcome: Ongoing (interventions implemented as appropriate)    12/12/17 1150   Gait Training PT LTG   Gait Training Goal PT LTG, Date Established 12/12/17   Gait Training Goal PT LTG, Time to Achieve by discharge   Gait  Training Goal PT LTG, [place] Level supervision required   Gait Training Goal PT LTG, Assist Device cane, straight   Gait Training Goal PT LTG, Distance to Achieve 150ft       Goal: Cardiopulmonary Goal LTG- PT  Outcome: Ongoing (interventions implemented as appropriate)    12/12/17 1150   Cardiopulmonary PT LTG   Cardiopulmonary PT LTG, Date Established 12/12/17   Cardiopulmonary PT LTG, Time to Achieve by discharge   Cardiopulmonary PT LTG, Additional Goal pt maintain O2 saturation at or above 90% on 2L supplemental O2 when walking

## 2017-12-12 NOTE — CONSULTS
Consult visit for conversation for Advance Care Planning/ Living Will.  Pt expresses that he has a completed document at home. States this is his first visit to our hospital.  Encouraged to bring to be scanned into EMR and also to give a copy to his PCP.

## 2017-12-12 NOTE — PLAN OF CARE
Problem: Patient Care Overview (Adult)  Goal: Plan of Care Review  Outcome: Ongoing (interventions implemented as appropriate)    12/12/17 0302   Coping/Psychosocial Response Interventions   Plan Of Care Reviewed With patient;spouse   Patient Care Overview   Progress no change   Outcome Evaluation   Outcome Summary/Follow up Plan pt has been dealing with quite a bit of pain with movement, prn pain medications have been very efffective, VSS, pt on O2 at 2L just like at home, gets very easily short-winded with any activity. Drsg to back stained, intact, PPP, denies numbness or lingling. Safety maintained. Pt retaining urine, bladder scan showed 267mL post-void          Problem: Perioperative Period (Adult)  Goal: Signs and Symptoms of Listed Potential Problems Will be Absent or Manageable (Perioperative Period)  Outcome: Ongoing (interventions implemented as appropriate)    Problem: Pain, Chronic (Adult)  Goal: Identify Related Risk Factors and Signs and Symptoms  Outcome: Outcome(s) achieved Date Met:  12/12/17  Goal: Acceptable Pain Control/Comfort Level  Outcome: Ongoing (interventions implemented as appropriate)    Problem: Fall Risk (Adult)  Goal: Identify Related Risk Factors and Signs and Symptoms  Outcome: Outcome(s) achieved Date Met:  12/12/17  Goal: Absence of Falls  Outcome: Ongoing (interventions implemented as appropriate)

## 2017-12-12 NOTE — PLAN OF CARE
Problem: Pain, Chronic (Adult)  Goal: Identify Related Risk Factors and Signs and Symptoms  Outcome: Ongoing (interventions implemented as appropriate)    12/11/17 1841   Pain, Chronic   Related Risk Factors (Chronic Pain) procedures/treatments   Signs and Symptoms (Chronic Pain) verbalization of pain descriptors       Goal: Acceptable Pain Control/Comfort Level  Outcome: Ongoing (interventions implemented as appropriate)    Problem: Fall Risk (Adult)  Goal: Identify Related Risk Factors and Signs and Symptoms  Outcome: Ongoing (interventions implemented as appropriate)  Goal: Absence of Falls  Outcome: Ongoing (interventions implemented as appropriate)

## 2017-12-12 NOTE — PROGRESS NOTES
"Abdirahman Cheung  72 y.o.      Chief complaint:   Back pain    Subjective  No events    Temp:  [97.7 °F (36.5 °C)-99.1 °F (37.3 °C)] 98.3 °F (36.8 °C)  Heart Rate:  [] 74  Resp:  [14-20] 18  BP: (104-177)/(52-94) 108/52      Objective:  General Appearance:  Comfortable, well-appearing, in pain and in no acute distress.    Vital signs: (most recent): Blood pressure 108/52, pulse 74, temperature 98.3 °F (36.8 °C), temperature source Oral, resp. rate 18, height 172.7 cm (68\"), weight 70.3 kg (155 lb), SpO2 96 %.  Vital signs are normal.  No fever.    Output: Producing urine.    Lungs:  Normal respiratory rate and normal effort.    Heart: Normal rate.  Regular rhythm.    Skin:  Warm and dry.        Neurologic Exam     Mental Status   Oriented to person, place, and time.   Attention: normal. Concentration: normal.   Speech: speech is normal   Level of consciousness: alert    Cranial Nerves   Cranial nerves II through XII intact.     Motor Exam   Muscle bulk: normal    Strength   Strength 5/5 throughout.     Sensory Exam   Light touch normal.       Principal Problem:    Vertebral compression fracture  Active Problems:    Compression fracture of spine      Lab Results (last 24 hours)     ** No results found for the last 24 hours. **              Plan:   S/p kyphoplasty with RFA  Continue with PT/OT  If has a good day, will consider DC later today    Martin Daigle, CLEVE    "

## 2017-12-12 NOTE — PLAN OF CARE
Problem: Patient Care Overview (Adult)  Goal: Plan of Care Review  Outcome: Ongoing (interventions implemented as appropriate)    12/12/17 1324   Coping/Psychosocial Response Interventions   Plan Of Care Reviewed With patient   Patient Care Overview   Progress progress toward functional goals as expected   Outcome Evaluation   Outcome Summary/Follow up Plan OT dov completed. Pt c/o pain 8/10 in back throughout tx. Pt was CGA for bed mobility, t/f and functional mobility with pt's straight cane. Pt was CGA- supervision for toileting. Pt demonstrated heavy breathing but had no reports of SOA and O2 sats remained above 90%. Skilled OT recommended to address ADL, functional mobility, education and pain. Recommended d/c home with assist and HHOT.         Problem: Inpatient Occupational Therapy  Goal: Patient Education Goal LTG- OT  Outcome: Ongoing (interventions implemented as appropriate)    12/12/17 1324   Patient Education OT LTG   Patient Education OT LTG, Date Established 12/12/17   Patient Education OT LTG, Time to Achieve by discharge   Patient Education OT LTG, Education Type precautions per surgeon;brace use/care;home safety;posture/body mechanics;positioning;adaptive equipment mgmt;energy conservation;work simplification;randy/doff brace;adaptive breathing   Patient Education OT LTG, Education Understanding demonstrates adequately;verbalizes understanding;independent       Goal: Bathing Goal LTG- OT  Outcome: Ongoing (interventions implemented as appropriate)    12/12/17 1324   Bathing OT LTG   Bathing Goal OT LTG, Date Established 12/12/17   Bathing Goal OT LTG, Time to Achieve by discharge   Bathing Goal OT LTG, Activity Type upper body bathing;lower body bathing   Bathing Goal OT LTG, [place] Level conditional independence;set up required   Bathing Goal OT LTG, Assist Device shower head, detachable;shower chair with back;grab bars;sponge, long handled       Goal: Toileting Goal LTG- OT  Outcome:  Ongoing (interventions implemented as appropriate)    12/12/17 1324   Toileting OT LTG   Toileting Goal OT LTG, Date Established 12/12/17   Toileting Goal OT LTG, Time to Achieve by discharge   Toileting Goal OT LTG, [place] Level set up required;supervision required       Goal: LB Dressing Goal LTG- OT  Outcome: Ongoing (interventions implemented as appropriate)    12/12/17 1324   LB Dressing OT LTG   LB Dressing Goal OT LTG, Date Established 12/12/17   LB Dressing Goal OT LTG, Time to Achieve by discharge   LB Dressing Goal OT LTG, [place] Level supervision required;set up required   LB Dressing Goal OT LTG, Adaptive Equipment laces, elastic;shoe horn, long handled;sock-aid;reacher

## 2017-12-12 NOTE — DISCHARGE INSTR - ACTIVITY
No bending, lifting, or twisting.  Do not lift more than 10 pounds.  No driving for 1 week and when no longer taking narcotics.  May shower/sponge bathe in 48 hours.

## 2017-12-13 ENCOUNTER — TELEPHONE (OUTPATIENT)
Dept: NEUROSURGERY | Facility: CLINIC | Age: 72
End: 2017-12-13

## 2017-12-13 LAB
CYTO UR: NORMAL
CYTO UR: NORMAL
LAB AP CASE REPORT: NORMAL
LAB AP CASE REPORT: NORMAL
Lab: NORMAL
Lab: NORMAL
PATH REPORT.FINAL DX SPEC: NORMAL
PATH REPORT.FINAL DX SPEC: NORMAL
PATH REPORT.GROSS SPEC: NORMAL
PATH REPORT.GROSS SPEC: NORMAL

## 2017-12-13 NOTE — PLAN OF CARE
Problem: Inpatient Occupational Therapy  Goal: Patient Education Goal LTG- OT  Outcome: Unable to achieve outcome(s) by discharge Date Met:  12/13/17 12/12/17 1324 12/13/17 0705   Patient Education OT LTG   Patient Education OT LTG, Date Established 12/12/17 --    Patient Education OT LTG, Time to Achieve by discharge --    Patient Education OT LTG, Education Type precautions per surgeon;brace use/care;home safety;posture/body mechanics;positioning;adaptive equipment mgmt;energy conservation;work simplification;randy/doff brace;adaptive breathing --    Patient Education OT LTG, Education Understanding demonstrates adequately;verbalizes understanding;independent --    Patient Education OT LTG, Date Goal Reviewed --  12/13/17   Patient Education OT LTG Outcome --  goal not met   Patient Education OT LTG, Reason Goal Not Met --  discharged from facility       Goal: Bathing Goal LTG- OT  Outcome: Unable to achieve outcome(s) by discharge Date Met:  12/13/17 12/12/17 1324 12/13/17 0705   Bathing OT LTG   Bathing Goal OT LTG, Date Established 12/12/17 --    Bathing Goal OT LTG, Time to Achieve by discharge --    Bathing Goal OT LTG, Activity Type upper body bathing;lower body bathing --    Bathing Goal OT LTG, [place] Level conditional independence;set up required --    Bathing Goal OT LTG, Assist Device shower head, detachable;shower chair with back;grab bars;sponge, long handled --    Bathing Goal OT LTG, Date Goal Reviewed --  12/13/17   Bathing Goal OT LTG, Outcome --  goal not met   Bathing Goal OT LTG, Reason Goal Not Met --  discharged from facility       Goal: Toileting Goal LTG- OT  Outcome: Unable to achieve outcome(s) by discharge Date Met:  12/13/17 12/12/17 1324 12/13/17 0705   Toileting OT LTG   Toileting Goal OT LTG, Date Established 12/12/17 --    Toileting Goal OT LTG, Time to Achieve by discharge --    Toileting Goal OT LTG, [place] Level set up required;supervision required --     Toileting Goal OT LTG, Date Goal Reviewed --  12/13/17   Toileting Goal OT LTG, Outcome --  goal not met   Toileting Goal OT LTG, Reason Goal Not Met --  discharged from facility       Goal: LB Dressing Goal LTG- OT  Outcome: Unable to achieve outcome(s) by discharge Date Met:  12/13/17 12/12/17 1324 12/13/17 0705   LB Dressing OT LTG   LB Dressing Goal OT LTG, Date Established 12/12/17 --    LB Dressing Goal OT LTG, Time to Achieve by discharge --    LB Dressing Goal OT LTG, [place] Level supervision required;set up required --    LB Dressing Goal OT LTG, Adaptive Equipment laces, elastic;shoe horn, long handled;sock-aid;reacher --    LB Dressing Goal OT LTG, Date Goal Reviewed --  12/13/17   LB Dressing Goal OT LTG, Outcome --  goal not met   LB Dressing Goal OT LTG, Reason Goal Not Met --  discharged from facility

## 2017-12-13 NOTE — TELEPHONE ENCOUNTER
"Sherrie called & left a voicemail asking me to call her back about the patient.  When I called her she said the patient complained of his \"back burning\" after he ate lunch.  She told her mom that she had contacted our office and would wait for a call back - then her mom told her to \"leave it alone b/c the burning has stopped\".  So Sherrie said nevermind, all was okay now.  I told her that if the patient experienced anything else or had any other questions he could call us back.  She agreed.    [place] CMA  "

## 2017-12-13 NOTE — THERAPY DISCHARGE NOTE
Acute Care - Occupational Therapy Discharge Summary  [facility]     Patient Name: Abdirahman Cheung  : 1945  MRN: [id number]    Today's Date: 2017  Onset of Illness/Injury or Date of Surgery Date: 17    Date of Referral to OT: 17  Referring Physician: Dr. Engle      Admit Date: 2017        OT Recommendation and Plan    Visit Dx:    ICD-10-CM ICD-9-CM   1. Compression fracture of vertebra, initial encounter M48.50XA 805.8   2. Impaired functional mobility, balance, gait, and endurance Z74.09 V49.89   3. Impaired mobility and ADLs Z74.09 799.89                     OT Goals       17 0705 17 1324       Patient Education OT LTG    Patient Education OT LTG, Date Established  17  -MM     Patient Education OT LTG, Time to Achieve  by discharge  -MM     Patient Education OT LTG, Education Type  precautions per surgeon;brace use/care;home safety;posture/body mechanics;positioning;adaptive equipment mgmt;energy conservation;work simplification;randy/doff brace;adaptive breathing  -MM     Patient Education OT LTG, Education Understanding  demonstrates adequately;verbalizes understanding;independent  -MM     Patient Education OT LTG, Date Goal Reviewed 17  -TS      Patient Education OT LTG Outcome goal not met  -TS      Patient Education OT LTG, Reason Goal Not Met discharged from facility  -TS      Bathing OT LTG    Bathing Goal OT LTG, Date Established  17  -MM     Bathing Goal OT LTG, Time to Achieve  by discharge  -MM     Bathing Goal OT LTG, Activity Type  upper body bathing;lower body bathing  -MM     Bathing Goal OT LTG, [place] Level  conditional independence;set up required  -MM     Bathing Goal OT LTG, Assist Device  shower head, detachable;shower chair with back;grab bars;sponge, long handled  -MM     Bathing Goal OT LTG, Date Goal Reviewed 17  -TS      Bathing Goal OT LTG, Outcome goal not met  -TS      Bathing Goal OT LTG, Reason Goal Not Met  discharged from facility  -TS      Toileting OT LTG    Toileting Goal OT LTG, Date Established  12/12/17  -MM     Toileting Goal OT LTG, Time to Achieve  by discharge  -MM     Toileting Goal OT LTG, [place] Level  set up required;supervision required  -MM     Toileting Goal OT LTG, Date Goal Reviewed 12/13/17  -TS      Toileting Goal OT LTG, Outcome goal not met  -TS      Toileting Goal OT LTG, Reason Goal Not Met discharged from facility  -TS      LB Dressing OT LTG    LB Dressing Goal OT LTG, Date Established  12/12/17  -MM     LB Dressing Goal OT LTG, Time to Achieve  by discharge  -MM     LB Dressing Goal OT LTG, [place] Level  supervision required;set up required  -MM     LB Dressing Goal OT LTG, Adaptive Equipment  laces, elastic;shoe horn, long handled;sock-aid;reacher  -MM     LB Dressing Goal OT LTG, Date Goal Reviewed 12/13/17  -TS      LB Dressing Goal OT LTG, Outcome goal not met  -TS      LB Dressing Goal OT LTG, Reason Goal Not Met discharged from facility  -TS        User Key  (r) = Recorded By, (t) = Taken By, (c) = Cosigned By    Initials Name Provider Type     SRIRAM Barrera/L Occupational Therapy Assistant    ESTEFANIA Worrell/L Occupational Therapist                Outcome Measures       12/12/17 1300 12/12/17 1100       How much help from another person do you currently need...    Turning from your back to your side while in flat bed without using bedrails?  3  -MS     Moving from lying on back to sitting on the side of a flat bed without bedrails?  3  -MS     Moving to and from a bed to a chair (including a wheelchair)?  3  -MS     Standing up from a chair using your arms (e.g., wheelchair, bedside chair)?  3  -MS     Climbing 3-5 steps with a railing?  2  -MS     To walk in hospital room?  3  -MS     AM-PAC 6 Clicks Score  17  -MS     How much help from another is currently needed...    Putting on and taking off regular lower body clothing? 2  -MM      Bathing  (including washing, rinsing, and drying) 2  -MM      Toileting (which includes using toilet bed pan or urinal) 3  -MM      Putting on and taking off regular upper body clothing 3  -MM      Taking care of personal grooming (such as brushing teeth) 4  -MM      Eating meals 4  -MM      Score 18  -MM      Functional Assessment    Outcome Measure Options AM-PAC 6 Clicks Daily Activity (OT)  -MM AM-PAC 6 Clicks Basic Mobility (PT)  -MS       User Key  (r) = Recorded By, (t) = Taken By, (c) = Cosigned By    Initials Name Provider Type    MS Elina Ramirez, PT DPT Physical Therapist    MM Alireza Maravilla, OTR/L Occupational Therapist              OT Discharge Summary  Reason for Discharge: Discharge from facility  Outcomes Achieved: Refer to plan of care for updates on goals achieved  Discharge Destination: Home with assist      AMARIS Cortes  12/13/2017

## 2017-12-14 NOTE — PLAN OF CARE
Problem: Inpatient Physical Therapy  Goal: Bed Mobility Goal LTG- PT  Outcome: Unable to achieve outcome(s) by discharge Date Met:  12/14/17 12/12/17 1150 12/14/17 0841   Bed Mobility PT LTG   Bed Mobility PT LTG, Date Established 12/12/17 --    Bed Mobility PT LTG, Time to Achieve by discharge --    Bed Mobility PT LTG, Activity Type all bed mobility --    Bed Mobility PT LTG, [place] Level independent --    Bed Mobility PT LTG, Date Goal Reviewed --  12/14/17   Bed Mobility PT LTG, Outcome --  goal not met   Bed Mobility PT LTG, Reason Goal Not Met --  discharged from facility       Goal: Transfer Training Goal 1 LTG- PT  Outcome: Outcome(s) achieved Date Met:  12/14/17 12/12/17 1150 12/14/17 0841   Transfer Training PT LTG   Transfer Training PT LTG, Date Established 12/12/17 --    Transfer Training PT LTG, Time to Achieve by discharge --    Transfer Training PT LTG, Activity Type all transfers --    Transfer Training PT LTG, [place] Level supervision required --    Transfer Training PT LTG, Assist Device cane, straight --    Transfer Training PT LTG, Date Goal Reviewed --  12/14/17   Transfer Training PT LTG, Outcome --  goal met       Goal: Gait Training Goal LTG- PT  Outcome: Unable to achieve outcome(s) by discharge Date Met:  12/14/17 12/12/17 1150 12/14/17 0841   Gait Training PT LTG   Gait Training Goal PT LTG, Date Established 12/12/17 --    Gait Training Goal PT LTG, Time to Achieve by discharge --    Gait Training Goal PT LTG, [place] Level supervision required --    Gait Training Goal PT LTG, Assist Device cane, straight --    Gait Training Goal PT LTG, Distance to Achieve 150ft --    Gait Training Goal PT LTG, Date Goal Reviewed --  12/14/17   Gait Training Goal PT LTG, Outcome --  goal not met   Gait Training Goal PT LTG, Reason Goal Not Met --  discharged from facility       Goal: Cardiopulmonary Goal LTG- PT  Outcome: Unable to achieve outcome(s) by discharge Date Met:   12/14/17 12/12/17 1150 12/14/17 0841   Cardiopulmonary PT LTG   Cardiopulmonary PT LTG, Date Established 12/12/17 --    Cardiopulmonary PT LTG, Time to Achieve by discharge --    Cardiopulmonary PT LTG, Additional Goal pt maintain O2 saturation at or above 90% on 2L supplemental O2 when walking --    Cardiopulmonary PT LTG, Date Goal Reviewed --  12/14/17   Cardiopulmonary PT LTG, Outcome --  goal not met   Cardiopulmonary PT LTG, Reason Goal Not Met --  discharged from facility

## 2017-12-14 NOTE — THERAPY DISCHARGE NOTE
Acute Care - Physical Therapy Discharge Summary  [facility]       Patient Name: Abdirahman Cheung  : 1945  MRN: [id number]    Today's Date: 2017  Onset of Illness/Injury or Date of Surgery Date: 17    Date of Referral to PT: 17  Referring Physician: Dr. Engle      Admit Date: 2017      PT Recommendation and Plan    Visit Dx:    ICD-10-CM ICD-9-CM   1. Compression fracture of vertebra, initial encounter M48.50XA 805.8   2. Impaired functional mobility, balance, gait, and endurance Z74.09 V49.89   3. Impaired mobility and ADLs Z74.09 799.89             Outcome Measures       17 1300 17 1100       How much help from another person do you currently need...    Turning from your back to your side while in flat bed without using bedrails?  3  -MS     Moving from lying on back to sitting on the side of a flat bed without bedrails?  3  -MS     Moving to and from a bed to a chair (including a wheelchair)?  3  -MS     Standing up from a chair using your arms (e.g., wheelchair, bedside chair)?  3  -MS     Climbing 3-5 steps with a railing?  2  -MS     To walk in hospital room?  3  -MS     AM-PAC 6 Clicks Score  17  -MS     How much help from another is currently needed...    Putting on and taking off regular lower body clothing? 2  -MM      Bathing (including washing, rinsing, and drying) 2  -MM      Toileting (which includes using toilet bed pan or urinal) 3  -MM      Putting on and taking off regular upper body clothing 3  -MM      Taking care of personal grooming (such as brushing teeth) 4  -MM      Eating meals 4  -MM      Score 18  -MM      Functional Assessment    Outcome Measure Options AM-PAC 6 Clicks Daily Activity (OT)  -MM AM-PAC 6 Clicks Basic Mobility (PT)  -MS       User Key  (r) = Recorded By, (t) = Taken By, (c) = Cosigned By    Initials Name Provider Type    MS Elina Ramirez, PT DPT Physical Therapist    MM Alireza Maravilla, OTR/L Occupational Therapist                       IP PT Goals       12/14/17 0841 12/12/17 1150       Bed Mobility PT LTG    Bed Mobility PT LTG, Date Established  12/12/17  -MS     Bed Mobility PT LTG, Time to Achieve  by discharge  -MS     Bed Mobility PT LTG, Activity Type  all bed mobility  -MS     Bed Mobility PT LTG, [place] Level  independent  -MS     Bed Mobility PT LTG, Date Goal Reviewed 12/14/17  -KJ      Bed Mobility PT LTG, Outcome goal not met  -KJ      Bed Mobility PT LTG, Reason Goal Not Met discharged from facility  -KJ      Transfer Training PT LTG    Transfer Training PT LTG, Date Established  12/12/17  -MS     Transfer Training PT LTG, Time to Achieve  by discharge  -MS     Transfer Training PT LTG, Activity Type  all transfers  -MS     Transfer Training PT LTG, [place] Level  supervision required  -MS     Transfer Training PT LTG, Assist Device  cane, straight  -MS     Transfer Training PT  LTG, Date Goal Reviewed 12/14/17  -KJ      Transfer Training PT LTG, Outcome goal met  -KJ      Gait Training PT LTG    Gait Training Goal PT LTG, Date Established  12/12/17  -MS     Gait Training Goal PT LTG, Time to Achieve  by discharge  -MS     Gait Training Goal PT LTG, [place] Level  supervision required  -MS     Gait Training Goal PT LTG, Assist Device  cane, straight  -MS     Gait Training Goal PT LTG, Distance to Achieve  150ft  -MS     Gait Training Goal PT LTG, Date Goal Reviewed 12/14/17  -KJ      Gait Training Goal PT LTG, Outcome goal not met  -KJ      Gait Training Goal PT LTG, Reason Goal Not Met discharged from facility  -KJ      Cardiopulmonary PT LTG    Cardiopulmonary PT LTG, Date Established  12/12/17  -MS     Cardiopulmonary PT LTG, Time to Achieve  by discharge  -MS     Cardiopulmonary PT LTG, Additional Goal  pt maintain O2 saturation at or above 90% on 2L supplemental O2 when walking  -MS     Cardiopulmonary PT LTG, Date Goal Reviewed 12/14/17  -KJ      Cardiopulmonary PT LTG, Outcome goal not met  -KJ       Cardiopulmonary PT LTG, Reason Goal Not Met discharged from facility  -DWIGHT        User Key  (r) = Recorded By, (t) = Taken By, (c) = Cosigned By    Initials Name Provider Type    DWIGHT Rossi PTA Physical Therapy Assistant    MS Elina Ramirez, PT DPT Physical Therapist              PT Discharge Summary  Anticipated Discharge Disposition: home with assist  Reason for Discharge: Discharge from facility  Outcomes Achieved: Refer to plan of care for updates on goals achieved  Discharge Destination: Home with assist      Kanchan Rossi PTA   12/14/2017

## 2018-01-04 ENCOUNTER — OFFICE VISIT (OUTPATIENT)
Dept: NEUROSURGERY | Facility: CLINIC | Age: 73
End: 2018-01-04

## 2018-01-04 VITALS
BODY MASS INDEX: 23.49 KG/M2 | SYSTOLIC BLOOD PRESSURE: 112 MMHG | HEIGHT: 68 IN | WEIGHT: 155 LBS | DIASTOLIC BLOOD PRESSURE: 58 MMHG

## 2018-01-04 DIAGNOSIS — S22.080D CLOSED WEDGE COMPRESSION FRACTURE OF TWELFTH THORACIC VERTEBRA WITH ROUTINE HEALING: ICD-10-CM

## 2018-01-04 DIAGNOSIS — S32.010D CLOSED WEDGE COMPRESSION FRACTURE OF FIRST LUMBAR VERTEBRA WITH ROUTINE HEALING: Primary | ICD-10-CM

## 2018-01-04 DIAGNOSIS — IMO0001 NORMAL BODY MASS INDEX (BMI): ICD-10-CM

## 2018-01-04 DIAGNOSIS — S32.030D CLOSED WEDGE COMPRESSION FRACTURE OF THIRD LUMBAR VERTEBRA WITH ROUTINE HEALING: ICD-10-CM

## 2018-01-04 DIAGNOSIS — Z87.891 FORMER SMOKER: ICD-10-CM

## 2018-01-04 PROCEDURE — 99213 OFFICE O/P EST LOW 20 MIN: CPT | Performed by: NURSE PRACTITIONER

## 2018-01-04 NOTE — PROGRESS NOTES
"    Chief complaint:   Chief Complaint   Patient presents with   • Post-op     Abdirahman returns today for a follow up to his Khyphoplasty for compression fractures.         Subjective     HPI: This is a 72-year-old male gentleman who went to the operating room on 12/11/2017 for T12 to L3 kyphoplasty, vertebral body biopsy, and radiofrequency ablation.  He is here in follow-up today.  He states overall it feels like is doing better compared to what he had been doing.  His daughter notices that he is standing up straighter and walking better.  He does still complain of some pain on the left side of his back but again he says that this is better.  Denies any lower extremity pain or numbness and tingling.  He is getting continue working with his oncologist as well as pulmonary regarding his respiratory issues.  Rates pain a scale 0-10 at a 4.      Review of Systems   Musculoskeletal: Positive for back pain.   Neurological: Negative.          Objective      Vital Signs  /58 (BP Location: Right arm, Patient Position: Sitting)  Ht 172.7 cm (68\")  Wt 70.3 kg (155 lb)  BMI 23.57 kg/m2    Physical Exam   Constitutional: He is oriented to person, place, and time. He appears well-developed and well-nourished.   HENT:   Head: Normocephalic.   Eyes: EOM are normal. Pupils are equal, round, and reactive to light.   Neck: Normal range of motion.   Pulmonary/Chest: Effort normal.   Musculoskeletal: Normal range of motion.        Lumbar back: He exhibits pain.   Neurological: He is alert and oriented to person, place, and time. He has normal strength and normal reflexes. No cranial nerve deficit or sensory deficit. Gait normal. GCS eye subscore is 4. GCS verbal subscore is 5. GCS motor subscore is 6.   Skin: Skin is warm.   Incisions clean dry and intact   Psychiatric: He has a normal mood and affect. His speech is normal and behavior is normal. Thought content normal.       Results Review: No new imaging.  Pathology results " revealed           Assessment/Plan: at this point we are going to follow-up again with the patient in 6 weeks to see how is doing from the surgery make sure that he continues to have improvement.  Should he still be having pain we see him again he may need a set of x-rays but overall he feels like is doing better at this time.          Abdirahman was seen today for post-op.    Diagnoses and all orders for this visit:    Closed wedge compression fracture of first lumbar vertebra with routine healing    Closed wedge compression fracture of third lumbar vertebra with routine healing    Closed wedge compression fracture of twelfth thoracic vertebra with routine healing    Normal body mass index (BMI)    Former smoker      I discussed the patients findings and my recommendations with patient  Martin Daigle, CLEVE  01/04/18  10:57 AM

## 2018-03-08 ENCOUNTER — OFFICE VISIT (OUTPATIENT)
Dept: NEUROSURGERY | Facility: CLINIC | Age: 73
End: 2018-03-08

## 2018-03-08 VITALS
WEIGHT: 155 LBS | HEIGHT: 68 IN | BODY MASS INDEX: 23.49 KG/M2 | SYSTOLIC BLOOD PRESSURE: 130 MMHG | DIASTOLIC BLOOD PRESSURE: 62 MMHG

## 2018-03-08 DIAGNOSIS — G89.29 CHRONIC BILATERAL LOW BACK PAIN WITHOUT SCIATICA: Primary | ICD-10-CM

## 2018-03-08 DIAGNOSIS — M54.50 CHRONIC BILATERAL LOW BACK PAIN WITHOUT SCIATICA: Primary | ICD-10-CM

## 2018-03-08 DIAGNOSIS — IMO0001 NORMAL BODY MASS INDEX (BMI): ICD-10-CM

## 2018-03-08 DIAGNOSIS — Z87.891 FORMER SMOKER: ICD-10-CM

## 2018-03-08 PROCEDURE — 99213 OFFICE O/P EST LOW 20 MIN: CPT | Performed by: NEUROLOGICAL SURGERY

## 2018-03-08 RX ORDER — HYDROCODONE BITARTRATE AND ACETAMINOPHEN 10; 325 MG/1; MG/1
TABLET ORAL
COMMUNITY
Start: 2017-12-19

## 2018-03-08 RX ORDER — TIOTROPIUM BROMIDE AND OLODATEROL 3.124; 2.736 UG/1; UG/1
SPRAY, METERED RESPIRATORY (INHALATION)
COMMUNITY
Start: 2018-03-06

## 2018-03-08 RX ORDER — DEXAMETHASONE 4 MG/1
TABLET ORAL
COMMUNITY
Start: 2018-02-08

## 2018-03-08 NOTE — PROGRESS NOTES
SUBJECTIVE:  Patient ID: Abdirahman Cheung is a 72 y.o. male is here today for follow-up.    Chief Complaint: Back pain  Chief Complaint   Patient presents with   • Back Pain     patient states he is doing good today; he continues to improve.       HPI  72-year-old gentleman who went to the operating room for a 4 level kyphoplasty and radiofrequency treatment for metastatic cancer last year.  He is doing very well.  He has no meaningful back pain or leg pain.  He is ambulating at home with the use of a walker or independently.  He is oxygen dependent 24 7.  He takes no pain medication.    The following portions of the patient's history were reviewed and updated as appropriate: allergies, current medications, past family history, past medical history, past social history, past surgical history and problem list.    OBJECTIVE:    Review of Systems   Respiratory: Positive for shortness of breath.    All other systems reviewed and are negative.         Physical Exam   Constitutional: He is oriented to person, place, and time. He appears well-developed and well-nourished.   HENT:   Head: Normocephalic and atraumatic.   Right Ear: Hearing normal.   Left Ear: Hearing normal.   Eyes: EOM are normal. Pupils are equal, round, and reactive to light.   Neck: Normal range of motion.   Neurological: He is alert and oriented to person, place, and time. He has normal strength and normal reflexes. No cranial nerve deficit or sensory deficit. He displays a negative Romberg sign. GCS eye subscore is 4. GCS verbal subscore is 5. GCS motor subscore is 6. He displays no Babinski's sign on the right side. He displays no Babinski's sign on the left side.   Psychiatric: His speech is normal. Judgment normal. Cognition and memory are normal.       Neurologic Exam     Mental Status   Oriented to person, place, and time.   Speech: speech is normal     Cranial Nerves     CN III, IV, VI   Pupils are equal, round, and reactive to light.  Extraocular  motions are normal.     Motor Exam     Strength   Strength 5/5 throughout.       Independent Review of Radiographic Studies:       ASSESSMENT/PLAN:  The patient is done very well after his kyphoplasty he essentially has no back pain at this point.  He continues to be followed by his oncologist for his cancer care.  At this point I only need to see him on an as-needed basis.      1. Chronic bilateral low back pain without sciatica    2. Former smoker    3. Normal body mass index (BMI)            Return if symptoms worsen or fail to improve.      Agapito Engle MD

## (undated) DEVICE — PROBE OCP220 OSTEOCOOL RF 17G 20MMX2: Brand: OSTEOCOOL™ RF ABLATION SYSTEM

## (undated) DEVICE — BONE TAMP KIT KPX203PB FF X2 20/3 1 STP: Brand: KYPHOPAK™ TRAY

## (undated) DEVICE — TOOLKIT VPT02A VP NEEDLE SET CURV 13 GA
Type: IMPLANTABLE DEVICE | Status: NON-FUNCTIONAL
Brand: KYPHON KURVE™ CURVED BONE FILLER DEVICE
Removed: 2017-12-11

## (undated) DEVICE — BONE TAMP KIT KPX153PB FF X2 15/3 1 STP: Brand: KYPHOPAK™ TRAY

## (undated) DEVICE — CONTAINER,SPECIMEN,OR STERILE,4OZ: Brand: MEDLINE

## (undated) DEVICE — PK KYPHOPLASTY 30

## (undated) DEVICE — TOWEL,OR,DSP,ST,BLUE,STD,4/PK,20PK/CS: Brand: MEDLINE

## (undated) DEVICE — CVR UNIV C/ARM

## (undated) DEVICE — TRY PREP SCRB VAG PVP

## (undated) DEVICE — DECANTER: Brand: UNBRANDED

## (undated) DEVICE — DEV CUT BIOP BONE KYPHX

## (undated) DEVICE — GLV SURG BIOGEL LTX PF 8

## (undated) DEVICE — BONE TAMP KIT KPX203NB AF X2 20/3

## (undated) DEVICE — CEMENT GUN AND BONE FILLER CDS2A SISE 2: Brand: MEDTRONIC REUSABLE INSTRUMENTS

## (undated) DEVICE — PK TURNOVER RM ADV

## (undated) DEVICE — GLV SURG BIOGEL LTX PF 6 1/2

## (undated) DEVICE — CEMENT CARTRIDGES CC02A CDS: Brand: KYPHON® CEMENT DELIVERY SYSTEM